# Patient Record
Sex: FEMALE | Race: WHITE | NOT HISPANIC OR LATINO
[De-identification: names, ages, dates, MRNs, and addresses within clinical notes are randomized per-mention and may not be internally consistent; named-entity substitution may affect disease eponyms.]

---

## 2018-09-25 ENCOUNTER — RECORDS - HEALTHEAST (OUTPATIENT)
Dept: ADMINISTRATIVE | Facility: OTHER | Age: 48
End: 2018-09-25

## 2018-09-25 ENCOUNTER — OFFICE VISIT - HEALTHEAST (OUTPATIENT)
Dept: VASCULAR SURGERY | Facility: CLINIC | Age: 48
End: 2018-09-25

## 2018-09-25 ENCOUNTER — AMBULATORY - HEALTHEAST (OUTPATIENT)
Dept: OTHER | Facility: CLINIC | Age: 48
End: 2018-09-25

## 2018-09-25 DIAGNOSIS — I83.893 SYMPTOMATIC VARICOSE VEINS OF BOTH LOWER EXTREMITIES: ICD-10-CM

## 2018-09-25 DIAGNOSIS — I87.2 VENOUS INSUFFICIENCY OF BOTH LOWER EXTREMITIES: ICD-10-CM

## 2018-09-26 ENCOUNTER — COMMUNICATION - HEALTHEAST (OUTPATIENT)
Dept: OTHER | Facility: CLINIC | Age: 48
End: 2018-09-26

## 2018-10-02 ENCOUNTER — AMBULATORY - HEALTHEAST (OUTPATIENT)
Dept: OTHER | Facility: CLINIC | Age: 48
End: 2018-10-02

## 2018-10-23 ENCOUNTER — RECORDS - HEALTHEAST (OUTPATIENT)
Dept: VASCULAR ULTRASOUND | Facility: CLINIC | Age: 48
End: 2018-10-23

## 2018-10-23 DIAGNOSIS — I83.893 VARICOSE VEINS OF BILATERAL LOWER EXTREMITIES WITH OTHER COMPLICATIONS: ICD-10-CM

## 2018-10-23 DIAGNOSIS — I87.2 VENOUS INSUFFICIENCY (CHRONIC) (PERIPHERAL): ICD-10-CM

## 2019-01-30 ENCOUNTER — COMMUNICATION - HEALTHEAST (OUTPATIENT)
Dept: TELEHEALTH | Facility: CLINIC | Age: 49
End: 2019-01-30

## 2019-01-30 ENCOUNTER — OFFICE VISIT - HEALTHEAST (OUTPATIENT)
Dept: VASCULAR SURGERY | Facility: CLINIC | Age: 49
End: 2019-01-30

## 2019-01-30 DIAGNOSIS — I87.2 VENOUS INSUFFICIENCY OF BOTH LOWER EXTREMITIES: ICD-10-CM

## 2019-01-30 DIAGNOSIS — K42.9 UMBILICAL HERNIA WITHOUT OBSTRUCTION AND WITHOUT GANGRENE: ICD-10-CM

## 2019-01-30 DIAGNOSIS — I83.893 SYMPTOMATIC VARICOSE VEINS OF BOTH LOWER EXTREMITIES: ICD-10-CM

## 2019-01-30 ASSESSMENT — MIFFLIN-ST. JEOR: SCORE: 1203.29

## 2019-05-08 ENCOUNTER — COMMUNICATION - HEALTHEAST (OUTPATIENT)
Dept: TELEHEALTH | Facility: CLINIC | Age: 49
End: 2019-05-08

## 2019-05-08 ENCOUNTER — RECORDS - HEALTHEAST (OUTPATIENT)
Dept: VASCULAR ULTRASOUND | Facility: CLINIC | Age: 49
End: 2019-05-08

## 2019-05-08 ENCOUNTER — RECORDS - HEALTHEAST (OUTPATIENT)
Dept: ADMINISTRATIVE | Facility: OTHER | Age: 49
End: 2019-05-08

## 2019-05-08 ENCOUNTER — OFFICE VISIT - HEALTHEAST (OUTPATIENT)
Dept: VASCULAR SURGERY | Facility: CLINIC | Age: 49
End: 2019-05-08

## 2019-05-08 DIAGNOSIS — I87.2 VENOUS INSUFFICIENCY (CHRONIC) (PERIPHERAL): ICD-10-CM

## 2019-05-08 DIAGNOSIS — I83.893 VARICOSE VEINS OF BILATERAL LOWER EXTREMITIES WITH OTHER COMPLICATIONS: ICD-10-CM

## 2019-05-08 DIAGNOSIS — I83.893 SYMPTOMATIC VARICOSE VEINS OF BOTH LOWER EXTREMITIES: ICD-10-CM

## 2019-05-08 DIAGNOSIS — I87.2 VENOUS INSUFFICIENCY OF BOTH LOWER EXTREMITIES: ICD-10-CM

## 2019-05-08 ASSESSMENT — MIFFLIN-ST. JEOR: SCORE: 1205.56

## 2019-05-20 ENCOUNTER — COMMUNICATION - HEALTHEAST (OUTPATIENT)
Dept: VASCULAR SURGERY | Facility: CLINIC | Age: 49
End: 2019-05-20

## 2019-08-20 ENCOUNTER — COMMUNICATION - HEALTHEAST (OUTPATIENT)
Dept: VASCULAR SURGERY | Facility: CLINIC | Age: 49
End: 2019-08-20

## 2019-08-30 ENCOUNTER — RECORDS - HEALTHEAST (OUTPATIENT)
Dept: VASCULAR ULTRASOUND | Facility: CLINIC | Age: 49
End: 2019-08-30

## 2019-08-30 DIAGNOSIS — I87.2 VENOUS INSUFFICIENCY (CHRONIC) (PERIPHERAL): ICD-10-CM

## 2019-08-30 DIAGNOSIS — I83.893 VARICOSE VEINS OF BILATERAL LOWER EXTREMITIES WITH OTHER COMPLICATIONS: ICD-10-CM

## 2019-09-11 ENCOUNTER — COMMUNICATION - HEALTHEAST (OUTPATIENT)
Dept: TELEHEALTH | Facility: CLINIC | Age: 49
End: 2019-09-11

## 2019-09-11 ENCOUNTER — OFFICE VISIT - HEALTHEAST (OUTPATIENT)
Dept: VASCULAR SURGERY | Facility: CLINIC | Age: 49
End: 2019-09-11

## 2019-09-11 DIAGNOSIS — K42.9 UMBILICAL HERNIA WITHOUT OBSTRUCTION AND WITHOUT GANGRENE: ICD-10-CM

## 2019-09-11 DIAGNOSIS — I87.2 VENOUS INSUFFICIENCY OF BOTH LOWER EXTREMITIES: ICD-10-CM

## 2019-09-11 DIAGNOSIS — I83.893 SYMPTOMATIC VARICOSE VEINS OF BOTH LOWER EXTREMITIES: ICD-10-CM

## 2019-09-16 ENCOUNTER — AMBULATORY - HEALTHEAST (OUTPATIENT)
Dept: OTHER | Facility: CLINIC | Age: 49
End: 2019-09-16

## 2019-09-24 ENCOUNTER — AMBULATORY - HEALTHEAST (OUTPATIENT)
Dept: OTHER | Facility: CLINIC | Age: 49
End: 2019-09-24

## 2020-01-29 ENCOUNTER — OFFICE VISIT - HEALTHEAST (OUTPATIENT)
Dept: VASCULAR SURGERY | Facility: CLINIC | Age: 50
End: 2020-01-29

## 2020-01-29 ENCOUNTER — COMMUNICATION - HEALTHEAST (OUTPATIENT)
Dept: TELEHEALTH | Facility: CLINIC | Age: 50
End: 2020-01-29

## 2020-01-29 DIAGNOSIS — I87.2 VENOUS INSUFFICIENCY OF BOTH LOWER EXTREMITIES: ICD-10-CM

## 2020-01-29 DIAGNOSIS — I83.893 SYMPTOMATIC VARICOSE VEINS OF BOTH LOWER EXTREMITIES: ICD-10-CM

## 2020-01-29 DIAGNOSIS — K42.9 UMBILICAL HERNIA WITHOUT OBSTRUCTION AND WITHOUT GANGRENE: ICD-10-CM

## 2020-02-12 ENCOUNTER — AMBULATORY - HEALTHEAST (OUTPATIENT)
Dept: VASCULAR SURGERY | Facility: CLINIC | Age: 50
End: 2020-02-12

## 2020-08-11 ENCOUNTER — COMMUNICATION - HEALTHEAST (OUTPATIENT)
Dept: VASCULAR SURGERY | Facility: CLINIC | Age: 50
End: 2020-08-11

## 2021-05-26 VITALS
HEART RATE: 72 BPM | SYSTOLIC BLOOD PRESSURE: 110 MMHG | DIASTOLIC BLOOD PRESSURE: 60 MMHG | RESPIRATION RATE: 12 BRPM | TEMPERATURE: 98.8 F

## 2021-05-26 VITALS
RESPIRATION RATE: 12 BRPM | HEART RATE: 72 BPM | SYSTOLIC BLOOD PRESSURE: 120 MMHG | TEMPERATURE: 98.9 F | DIASTOLIC BLOOD PRESSURE: 70 MMHG

## 2021-05-28 NOTE — PROGRESS NOTES
US today bilateral GSv RFA option reviewed and will preauth > 3months compression, symp bilateral VV,[facility],hernia sx by Dr Hairston

## 2021-05-28 NOTE — PATIENT INSTRUCTIONS - HE
Varicose Vein Pre-Procedure Instructions    You are scheduled for a varicose vein treatment on your legs. The following is some helpful information for you in regards to your treatment.    **Important:  A  will be needed post procedure. We will supply a thigh high compression stocking for you unless if you have one.  Please be aware, it is not advised to fly within 3 weeks post procedure    Please wear comfortable clothing.  We recommend that you bring a change of under clothes; they may get stained by the cleansing solution.    Feel free to bring a personal music player or a CD to listen to during your procedure.    Take your routine medications as you normally would.    It is ok to eat prior to this procedure.    Please allow 1- 2 hours for your appointment.    For any questions regarding your procedure please call   [phone] to speak with the nurse.    If you would like a Good [place]elo Estimate for your upcoming service/procedure contact Cost of Care Estimates at [phone], advocates are available Monday through Friday 8am - 5pm.  Code [id number]    Please have the following information available:  1. Patient name and date of birth  2. Insurance company, plan name, ID and group numbers  3. Description of the service/procedure and the associated procedure code numbers, if available. If more than one (1) procedure code, indicate which will be the primary procedure code.   4. The facility where the service/procedure will be performed.  5. The name of the physician involved with the service/procedure.  6. Appointment date of service.  7. Telephone number to call with the information.

## 2021-05-28 NOTE — PROGRESS NOTES
Patient wearing compression socks and will wrap legs in the Summer.  Patient complains of pain rated @ 3 today.

## 2021-05-28 NOTE — TELEPHONE ENCOUNTER
Ashli from [facility] called back and left a message but did not leave any information what she is looking for regarding the prior authorization. Placed call to her again and requested she call back with what information she is looking for regarding RFA prior authorization.

## 2021-05-28 NOTE — TELEPHONE ENCOUNTER
Ashli called back from [facility] and requested an updated office note from 5/8/19 and ultrasound report from that day.    Both of these documents were faxed with confirmation.

## 2021-05-31 NOTE — TELEPHONE ENCOUNTER
Left message for patient regarding vein ablation procedure next week. Advised to bring a . Explained it is ok to eat and drink prior to procedure with exception of blood thinner. We will supply patient with a thigh high compression sock. We carry from size medium to extra large.If patient doesn't fit into them they will need to provide their own. Questions answered. Call if any further questions.

## 2021-06-01 NOTE — PROGRESS NOTES
S: Patient was seen in [place] to be measured for knee-high compression stockings 20-30 mmHg like she has received from me in the past. RX on-file is current from Dr. Lester.    O: Goal is to evaluate and measure patient for a compression garment that will help control her Varicose Veins and Venous Insufficiency. Tabatha's legs are wrapped currently after her laser varicose vein removal surgery with Dr. Umana.    A: I took measurements again for her to get another pair of charcoal Medi Comfort Vitality compression stockings 20-30 mmhg, as well as a pair of Medi Comfort knee-highs 20-30 mmHg in wheat (both in 2, std which is the same size as last time).    P: I will call Tabatha when her garments are in before shipping to her home address which was reverified.

## 2021-06-01 NOTE — PATIENT INSTRUCTIONS - HE
Resume normal activity with exception of no flight for one more week.  Use compression socks as much as possible when on feet, long car rides and on air planes.  Return to clinic in 4 months.  Call if any questions [phone]   Advised to have H and P

## 2021-06-01 NOTE — PROGRESS NOTES
S: I have received Tabatha's Medi Comfort and Medi Comfort Vitality knee high compression stockings. Rx on file is current.    A: No assessment was made. I will be shipping the garments to her home address, along with proper compliance paperwork to be signed, dated, and returned.    P: She is to call with any further needs.

## 2021-06-01 NOTE — PROGRESS NOTES
Post Procedure Note Endovenous Closure    S: Tabatha Hogue is a 49 y.o. female S/P Bilateral  leg endovenous closure ofBilateral lower ext. Two weeks out from procedure. Doing well, wore female  thigh high socks. Minimal discomfort. Some superficial phlibitis. Which is resolving.  She is also here and wants to discuss about fixing her umbilical hernia this will be done through same-day surgery.    O:   Vitals:    09/11/19 1207   BP: 120/70   Pulse: 72   Resp: 12   Temp: 98.9  F (37.2  C)   TempSrc: Oral       General: no apparent distress  Abdomen: She has umbilical hernia about 1 1/2 cm in diameter reduce it was pretty tender on reduction  Legs look good no signs of infection, incisions healing nicely.    A/P: S/p endovenous closure. For insuffiencey of veins   Umbilical hernia    We will schedule umbilical hernia seen by general surgery office.  I gave her my 's number.  Orders have been written for umbilical hernia repair under local MAC anesthesia the same day surgery setting.  She understands risk of infection bleeding recurrence rates.  Also she will need to have a preop history and physical.    From the standpoint of VNUS Closure she is doing well has some bruising discussed and answer questions related to that    May switch to knee high support socks   Resume all activities   RTC 4 months   Call for any questions or concerns     Simba Lester MD   [facility]

## 2021-06-01 NOTE — PROGRESS NOTES
2 week f/u RFA. Doing wellResume normal activity with exception of no flight for one more week.  Use compression socks as much as possible when on feet, long car rides and on air planes.  Return to clinic in 4 months.  Umbilical hernia to be repaired.

## 2021-06-02 VITALS — WEIGHT: 129.5 LBS | BODY MASS INDEX: 21.58 KG/M2 | HEIGHT: 65 IN

## 2021-06-03 VITALS — BODY MASS INDEX: 21.66 KG/M2 | WEIGHT: 130 LBS | HEIGHT: 65 IN

## 2021-06-05 NOTE — PROGRESS NOTES
4 month f/u psot RFA bilateral GSV, Umbilical hernia. Left post knee and right shin/calf stab phlebectomy code 37766x2 offered.Will preauth through [facility]ope to see for sclerotherapy

## 2021-06-06 NOTE — PROGRESS NOTES
[facility] stab phlebectomy CPT 37766x2 [facility] [phone].

## 2021-06-17 NOTE — PATIENT INSTRUCTIONS - HE
"Patient Instructions by Jesus Fragoso RN at 1/30/2019 10:40 AM     Author: Jesus Fragoso RN Service: -- Author Type: Registered Nurse    Filed: 1/30/2019 11:32 AM Encounter Date: 1/30/2019 Status: Addendum    : Jesus Fragoso RN (Registered Nurse)    Related Notes: Original Note by Jesus Fragoso RN (Registered Nurse) filed at 1/30/2019 11:26 AM       We are prescribing some compression stockings for you. I have included different suppliers that should help you get measured and fitting to ensure proper fitting socks. You should wear this socks as much as you can. It is especially important to wear them with long periods of sitting/standing, long car rides or if you will be flying. Compression socks should get refilled every 4-6 months. They do not need to be worn at night while in bed.    If you do a lot of standing it is good to do calf raises to help keep the blood pumping. If you sit a lot at work it is good to get up periodically to walk around. Elevation of the foot of your bed 4-6\" helps the blood return back to where it is needed.        Varicose Veins      Varicose veins are swollen, enlarged veins most often found in the legs. They are usually blue or purple in color and may bulge, twist, and stand out under the skin.  Normally, veins return blood from the body to the heart. The leg veins have one-way valves that prevent blood from flowing backward in the vein. When the valves are weak or damaged, blood backs up in the veins. This may cause some of the veins to swell and bulge and become varicose veins.  Symptoms  Varicose veins may or may not cause symptoms. If symptoms do occur, they can include:    Legs that feel tired, achy, heavy, or itchy    Leg muscle cramps    Skin changes, such as discoloration, dryness, redness, or rash (in more severe cases, you may also have sores on the skin called venous leg ulcers)  Risk Factors  There are a number of factors that increase the risk for varicose " veins. These can include:    Being a woman    Being older    Sitting or standing for long periods    Being overweight    Being pregnant    Having a family history of varicose veins  Treatment begins with simple self-help measures (see below). If these dont help, there are many procedures that can be done to shrink or remove varicose veins. Your healthcare provider can tell you more about these options, if needed.  Home care    Support or compression stockings will likely be prescribed. If so, be sure to wear them as directed. They may help improve blood flow.    Exercising helps strengthen your leg muscles and improve blood flow. To get the most benefit, choose exercises such as walking, swimming, or cycling. Also try to exercise for at least 30 minutes on most days.    Raising (elevating) your legs lets gravity help blood flow back to the heart. Sit or lie with your feet above heart level a few times throughout the day, or as directed.    Avoid long periods of sitting or standing. Change positions often. Also, move your ankles, toes and knees often. This may also help improve blood flow.    If you are overweight, talk with your healthcare provider about setting up a weight-loss plan. Maintaining a healthy weight can help reduce the strain on your veins. It may also improve symptoms, such as swelling and aching.    If you have dryness and itching, ask your provider about special lotions that can be applied to the skin to help improve symptoms.  Follow-up care  Follow up with your healthcare provider, or as directed. If imaging tests were done, youll be told the results and if there are any new findings that affect your care.  When to seek medical advice  Call your healthcare provider right away if any of these occur:    Sudden, severe leg swelling, pain, or redness    Symptoms worsen, or they dont improve with self-care    Bleeding from any affected veins    Ulcers form on the legs, ankles, or feet    Fever of  100.4 F (38 C) or higher, or as advised by your provider      Understanding Spider and Varicose Veins  Do you often hide your legs because of the way they look? You may have noticed tiny red or blue bursts (spider veins). Or maybe you have veins that bulge or look twisted (varicose veins). If so, there are treatments that can help  What are the symptoms?  Spider veins or varicose veins may never be a problem. But sometimes they can cause legs to ache or swell. Your legs may also feel heavy and tired, or like theyre burning. These symptoms may be more severe at the end of the day. Prolonged sitting or standing can also make your symptoms worse.  Who gets spider and varicose veins?  Anyone can get spider or varicose veins. But vein problems tend to be hereditary (run in families). Other factors that can affect veins include:    Pregnancy, hormones, and birth control pills    A job where you stand or sit a lot    Extra weight or lack of exercise    Age         Spider veins look like tiny webs on the ankles, legs, and upper thighs.       Ropy, dark blue, red, or flesh-colored varicose veins are most common on the thighs, calves, and feet.    What can be done?  Spider and varicose veins can affect the way you feel about yourself. Talk to your healthcare provider about your concerns. There are treatments that can ease symptoms and make your legs look better.  Your treatment choices  Treatment may include self-care, sclerotherapy (injecting veins with a chemical), surgery, or newer nonsurgical minimally invasive therapies. Spider veins and some varicose veins can be treated with sclerotherapy. Large varicose veins can often be treated with newer minimally invasive procedures and, in rare cases, surgery may be needed.     Please call [facility] to schedule an appointment. If you received a prescription please bring it with you to your appointment. You may call one of the locations below, although  some locations are limited to what they carry.    Office Locations    [place]  [address]/Orthotics and Prosthetics [address]  [place] [address]   Phone: [phone]  Fax [phone]    [facility]  [address]  [place] [address]  Phone: [phone]  Fax: [phone]    [facility]   [address]  [place] [address]  Phone: [phone]  Fax: [phone]    [facility]  [address]  [place] [address]  Phone: [phone]  Fax: [phone]    [facility]  [address]  [place] [address]  Phone: [phone]  Fax: [phone]    [facility]  [address]   [place] [address]   Phone: [phone]  Fax: [phone]    [place]   [address] [place] [address]   Phone: [phone]  Fax: [phone]    [facility]  [address]  [place] [address]    [place] [phone]  [phone]  [phone]  [place] [phone]  [email or web address]      [facility]   [address]             [address]                 [address]     [place] [address]      [place] [address]         [place] [address]  [phone]  [phone]     [phone]               Fax: [phone]  [email or web address]                                                     [facility]  [address] [place] [address]  [phone]  [phone]  [email or web address]    Jerome Marti  [phone]  [email or web address]    [facility]   [phone]    [place] [address]  [place] [address]    [phone]    Patient Education     Hernia (Adult)    A hernia can happen when there is a weakness or defect in the wall of the abdomen or groin. Intestines or nearby tissues may move from their usual location and push through the weakness in the wall. This can cause a hernia (bulge) you may see or feel.  Causes and risk factors   A hernia may be present at birth. Or it may be caused by the wear and tear of daily living. Certain factors can make a hernia more likely. These can include:    Heavy lifting    Straining, whether from lifting, movement, or constipation    Chronic cough    Injury to the abdominal wall    Excess weight    Pregnancy    Prior surgery    Older age    Family history of hernia  Symptoms  Symptoms of a hernia may come on suddenly. Or they may appear slowly over time. Some common symptoms include:    Bulge in the groin area, around the navel, or in the scrotum (the bulge may get bigger when you stand and go away when you lie down)    Pain or pressure around the bulge    Pain during activities such as lifting, coughing, or sneezing    A feeling of weakness or pressure in the groin    Pain or swelling in the scrotum  Types of hernias  There are different types of hernia. The type you have depends on its location:    Inguinal. This type is in the groin or scrotum. It is more common in men.    Femoral. This type is in the groin, upper thigh (where the leg bends), or labia. It is more common in women.    Ventral. This type is in the abdominal wall.    Umbilical. This type occurs around the navel (belly button).    Incisional. This type occurs at the site of a previous surgery.  The condition of the hernia can help determine how urgently it needs to be treated.    Reducible. It goes back in by itself, or it can be pushed back in.    Irreducible. It cant be pushed back in.    Incarcerated/strangulated. The intestine is trapped (incarcerated). If this happens, you wont  be able to push the bulge back in. If the incarcerated hernia isnt treated, it may become strangulated. This means the area loses blood supply and the tissue may die. This requires emergency surgery. You need treatment right away.  In most cases, a hernia will not heal on its own. Surgery is usually needed to repair the defect in the abdominal wall or groin. Youll be told more about surgery, if needed.  If your symptoms are not severe, treatment may sometimes be delayed. In such cases, regular follow-up visits with the provider will be needed. Youll be asked to keep track of your symptoms and to watch for signs of more serious problems. You may also be given guidelines similar to the home care instructions below.  Home care  To help keep a hernia from getting worse, you may be advised to:    Avoid heavy lifting and straining as directed.    Take steps to prevent constipation, such as eating more fiber and drinking more water. This may help reduce straining that can occur when having a bowel movement. Reducing straining may help keep your symptoms from getting worse.    Maintain a healthy weight or lose excess weight. This can help reduce strain on abdominal muscles and tissues.    Stop smoking. This can help prevent coughing that may also strain abdominal muscles and tissues.  Follow-up care  Follow up with your healthcare provider, or as directed. If imaging tests were done, they will be reviewed a doctor. You will be told the results and any new findings that may affect your care.  When to seek medical advice  Call your healthcare provider right away if any of these occur:    Hernia hardens, swells, or grows larger    Hernia can no longer be pushed back in    Pain moves to the lower right abdomen (just below the waistline), or spreads to the back  Call 911  Call 911 if any of these occur:    Nausea and vomiting    Severe pain, redness, or tenderness in the area near the hernia    Pain worsens quickly and doesnt  get better    Inability to have a bowel movement or pass gas    Fever of 100.4 F (38 C) or higher, or as directed by your healthcare provider    Trouble breathing    Fainting    Rapid heart rate    Vomiting blood    Large amounts of blood in stool  Date Last Reviewed: 6/9/2015 2000-2017 [employer]. [address] [place] [address]. All rights reserved. This information is not intended as a substitute for professional medical care. Always follow your healthcare professional's instructions.      SCLEROTHERAPY  Dr. Laura Linn  [phone]

## 2021-06-18 NOTE — LETTER
Letter by Simba Lester MD at      Author: Simba Lester MD Service: -- Author Type: --    Filed:  Encounter Date: 2019 Status: (Other)       Katelyn Garduno MD  [address]  [facility] [address]                                  2019    Patient: Tabatha Hogue   MR Number: [id number]   YOB: 1970   Date of Visit: 2019     Dear Dr. Laureen MD:    Thank you for referring Tabatha Hogue to me for evaluation. Below are the relevant portions of my assessment and plan of care.    If you have questions, please do not hesitate to call me. I look forward to following Tabatha along with you.    Sincerely,        Simba Lester MD          CC  No Recipients  Simba Lester MD  2019  4:15 PM  Sign at close encounter  [facility] Follow up    HPI:    48 y.o. year old female who returns for a follow up.  She has symptomatic leg pain and issues.  She is here for follow-up she is worn compression now for this whole time.  With continued ongoing symptoms.  She also at this time point is some a new issue which is a symptomatic umbilical hernia.  This bulges all the time because of symptoms especially with lifting or doing anything physical.  Started inhibit her working out in her exercise routine.    Allergies:Erythromycin and Morphine    History reviewed. No pertinent past medical history.    Past Surgical History:   Procedure Laterality Date   ?  SECTION      2 live babies       CURRENT MEDS:  Current Outpatient Medications on File Prior to Visit   Medication Sig Dispense Refill   ? aspirin-acetaminophen-caffeine (EXCEDRIN MIGRAINE) 250-250-65 mg per tablet Take 1 tablet by mouth.       No current facility-administered medications on file prior to visit.        History reviewed. No pertinent family history.     reports that  has never smoked. she has never used smokeless tobacco. She reports that she drinks alcohol. She reports that she does not use  "drugs.    Review of Systems:  Negative except leg pain bilaterally umbilical hernia pain.Otherwise twelve system of review is negative.      OBJECTIVE:  Vitals:    01/30/19 1105   BP: 104/76   Pulse: 72   Resp: 16   Temp: 98.5  F (36.9  C)   TempSrc: Oral   Weight: 129 lb 8 oz (58.7 kg)   Height: 5' 5\" (1.651 m)     Body mass index is 21.55 kg/m .    EXAM:  GENERAL: This is a well-developed 48 y.o. female who appears her stated age  HEAD: normocephalic  HEENT: Pupils equal and reactive bilaterally  CARDIAC: RRR without murmur  CHEST/LUNG:  Clear to auscultation  ABDOMEN: Soft, nontender, nondistended, umbilical hernia which is reducible.  No other masses are noted.  NEUROLOGIC: Focally intact, nonfocal  VASCULAR: Pulses intact, symmetrical upper and lower extremities.  Bilateral varicose veins and spider veins noted bilaterally.                                     LABS:  No results found for: WBC, HGB, HCT, MCV, PLT  INR/Prothrombin Time      No results found for: HGBA1C  No results found for: ALT, AST, GGT, ALKPHOS, BILITOT     Images:     US Venous Insufficiency Legs Bilateral (Order [id number])   Imaging   Date: 10/23/2018 Department: [facility] Ultrasound [place] Released By: Tanisha Goel Authorizing: Simba Lester MD   Study Result     [facility] OUT[facility]IENT  10/23/2018     EXAM: BILATERAL LOWER EXTREMITY DEEP AND SUPERFICIAL VENOUS DUPLEX ULTRASOUND WITH PHYSIOLOGIC TESTING     INDICATION: Bilateral lower extremity pain and swelling. Symptomatic varicose veins. Assess for incompetent veins.     TECHNIQUE: Supine and upright ultrasound of the deep and superficial veins with Valsalva and compression augmentation maneuvers. Duplex imaging is performed utilizing gray-scale, two-dimensional images, color-flow imaging, Doppler waveform analysis, and   spectral Doppler imaging.      INCOMPETENCY CRITERIA: Deep vein reflux reported when greater than 1,000 ms flow reversal.  " Superficial vein reflux reported when greater than 500 ms flow reversal.  vein reflux reported as greater than 350 ms flow reversal.     DEEP VEIN FINDINGS:     RIGHT LEG: The common femoral, profunda femoral, femoral, popliteal, and visualized calf veins are patent and compressible.  Incompetent right common femoral vein and popliteal vein.     LEFT LEG:  The common femoral, profunda femoral, femoral, popliteal, and visualized calf veins are patent and compressible.   No incompetency of the deep left leg veins.     RIGHT SUPERFICIAL VEIN FINDINGS:  GREAT SAPHENOUS VEIN: Segmental incompetency of the right great saphenous vein at the proximal thigh, knee and mid calf. The right great saphenous vein measures 10 mm at the saphenofemoral junction, 6 mm proximal thigh, 5 mm knee and 6 mm mid calf.     SMALL SAPHENOUS VEIN: The right small saphenous vein is too small to visualize at the proximal calf and mid calf. The right small saphenous vein is patent and competent at the saphenous popliteal junction.     No incompetent perforating veins or abnormal accessory veins identified.     LEFT SUPERFICIAL VEIN FINDINGS:  GREAT SAPHENOUS VEIN: Segmental incompetency of the left great saphenous vein at the proximal thigh and mid calf. The left great saphenous vein measures 7 mm at the saphenofemoral junction, 4 mm proximal thigh, 3 mm knee and 4 mm mid calf.     SMALL SAPHENOUS VEIN: Left small saphenous vein is patent and competent at the saphenous popliteal junction. The left small saphenous vein is too small to visualize at the proximal calf and mid calf.     No incompetent perforating veins or abnormal accessory veins identified.     IMPRESSION:   CONCLUSION:   1.  No deep venous thrombosis of either lower extremity. Incompetent right common femoral vein and popliteal vein.  2.  RIGHT LEG: Segmental incompetency of the right great saphenous vein at the proximal thigh, knee and mid calf.  3.  LEFT LEG: Segmental  incompetency of the left great saphenous vein at the proximal thigh and mid calf.         Assessment/Plan:   1. Symptomatic varicose veins of both lower extremities  At this time point I would continue her current compression socks exercise routine and regular basis.. Qualify for a closure procedure but I would probably repeat her ultrasound here in another 3 months to be 6 months after a year out from her prior study.  - Compression stockings  - US Venous Insufficiency Legs Bilateral; Future    2. Venous insufficiency of both lower extremities  Same  - Compression stockings  - US Venous Insufficiency Legs Bilateral; Future    3. Umbilical hernia without obstruction and without gangrene  At this time point umbilical hernia can be easily repaired under same day surgery in outpatient setting.  This was done under local MAC anesthesia.  This hernia is small enough I think most likely be done with a primary repair.  We discussed the risks of infection bleeding clotting DVTs recurrence rates and at this time point she would like to proceed.  We will try to arrange through my general surgery scheduling clinic.  All questions today and orders were placed.    - Verify informed consent for procedure; Standing  - Verify informed consent for Anesthesia; Standing  - Skin prep - Clip hair as needed; Standing  - Apply 2% Chlorhexidine Gluconate cloth (Rick Cloth) to surgical area.; Standing  - Insert and maintain IV; Standing  - sodium chloride bacteriostatic 0.9 % injection 0.1-0.3 mL; Inject 0.1-0.3 mL under the skin as needed (for IV insertion).        - sodium chloride flush 3 mL (NS); Infuse 3 mL into a venous catheter Line Care.        - Case Request (Do not enter this order if case already scheduled): REPAIR, HERNIA, UMBILICAL, OPEN; Standing  - Place sequential compression device; Standing  - ceFAZolin 2 g in 100 mL in D5W (ANCEF); Infuse 100 mL (2 g total) into a venous catheter 30 minutes before surgery or procedure  (prior to procedure).        - Case Request (Do not enter this order if case already scheduled): REPAIR, HERNIA, UMBILICAL, OPEN         Simba Lester MD  [facility] Department of Surgery

## 2021-06-18 NOTE — PATIENT INSTRUCTIONS - HE
"Patient Instructions by Jesus Fragoso RN at 1/29/2020 11:40 AM     Author: Jesus Fragoso RN Service: -- Author Type: Registered Nurse    Filed: 1/29/2020 12:31 PM Encounter Date: 1/29/2020 Status: Addendum    : Jesus Fragoso RN (Registered Nurse)    Related Notes: Original Note by Jesus Fragoso RN (Registered Nurse) filed at 1/29/2020 12:13 PM       We are prescribing some compression stockings for you. I have included different suppliers that should help you get measured and fitting to ensure proper fitting socks. You should wear this socks as much as you can. It is especially important to wear them with long periods of sitting/standing, long car rides or if you will be flying. Compression socks should get refilled every 4-6 months. They do not need to be worn at night while in bed.    If you do a lot of standing it is good to do calf raises to help keep the blood pumping. If you sit a lot at work it is good to get up periodically to walk around. Elevation of the foot of your bed 4-6\" helps the blood return back to where it is needed.        Varicose Veins      Varicose veins are swollen, enlarged veins most often found in the legs. They are usually blue or purple in color and may bulge, twist, and stand out under the skin.  Normally, veins return blood from the body to the heart. The leg veins have one-way valves that prevent blood from flowing backward in the vein. When the valves are weak or damaged, blood backs up in the veins. This may cause some of the veins to swell and bulge and become varicose veins.  Symptoms  Varicose veins may or may not cause symptoms. If symptoms do occur, they can include:    Legs that feel tired, achy, heavy, or itchy    Leg muscle cramps    Skin changes, such as discoloration, dryness, redness, or rash (in more severe cases, you may also have sores on the skin called venous leg ulcers)  Risk Factors  There are a number of factors that increase the risk for varicose " veins. These can include:    Being a woman    Being older    Sitting or standing for long periods    Being overweight    Being pregnant    Having a family history of varicose veins  Treatment begins with simple self-help measures (see below). If these dont help, there are many procedures that can be done to shrink or remove varicose veins. Your healthcare provider can tell you more about these options, if needed.  Home care    Support or compression stockings will likely be prescribed. If so, be sure to wear them as directed. They may help improve blood flow.    Exercising helps strengthen your leg muscles and improve blood flow. To get the most benefit, choose exercises such as walking, swimming, or cycling. Also try to exercise for at least 30 minutes on most days.    Raising (elevating) your legs lets gravity help blood flow back to the heart. Sit or lie with your feet above heart level a few times throughout the day, or as directed.    Avoid long periods of sitting or standing. Change positions often. Also, move your ankles, toes and knees often. This may also help improve blood flow.    If you are overweight, talk with your healthcare provider about setting up a weight-loss plan. Maintaining a healthy weight can help reduce the strain on your veins. It may also improve symptoms, such as swelling and aching.    If you have dryness and itching, ask your provider about special lotions that can be applied to the skin to help improve symptoms.  Follow-up care  Follow up with your healthcare provider, or as directed. If imaging tests were done, youll be told the results and if there are any new findings that affect your care.  When to seek medical advice  Call your healthcare provider right away if any of these occur:    Sudden, severe leg swelling, pain, or redness    Symptoms worsen, or they dont improve with self-care    Bleeding from any affected veins    Ulcers form on the legs, ankles, or feet    Fever of  100.4 F (38 C) or higher, or as advised by your provider      Understanding Spider and Varicose Veins  Do you often hide your legs because of the way they look? You may have noticed tiny red or blue bursts (spider veins). Or maybe you have veins that bulge or look twisted (varicose veins). If so, there are treatments that can help  What are the symptoms?  Spider veins or varicose veins may never be a problem. But sometimes they can cause legs to ache or swell. Your legs may also feel heavy and tired, or like theyre burning. These symptoms may be more severe at the end of the day. Prolonged sitting or standing can also make your symptoms worse.  Who gets spider and varicose veins?  Anyone can get spider or varicose veins. But vein problems tend to be hereditary (run in families). Other factors that can affect veins include:    Pregnancy, hormones, and birth control pills    A job where you stand or sit a lot    Extra weight or lack of exercise    Age         Spider veins look like tiny webs on the ankles, legs, and upper thighs.       Ropy, dark blue, red, or flesh-colored varicose veins are most common on the thighs, calves, and feet.    What can be done?  Spider and varicose veins can affect the way you feel about yourself. Talk to your healthcare provider about your concerns. There are treatments that can ease symptoms and make your legs look better.  Your treatment choices  Treatment may include self-care, sclerotherapy (injecting veins with a chemical), surgery, or newer nonsurgical minimally invasive therapies. Spider veins and some varicose veins can be treated with sclerotherapy. Large varicose veins can often be treated with newer minimally invasive procedures and, in rare cases, surgery may be needed.     Please call [facility] to schedule an appointment. If you received a prescription please bring it with you to your appointment. You may call one of the locations below, although  some locations are limited to what they carry.    Office Locations  New Locations  [facility]  Home Medical Equipment  [address], [place] [address]  Orthotics and Prosthetics ([facility])  [address], [place] [address]  Phone [phone] /Fax [phone]        [place]   [address]   Medical [address]/Orthotics and Prosthetics [address]  [place] [address]   Phone: [phone]  Fax [phone]    [facility]  [address]  [place] [address]  Phone: [phone]  Fax: [phone]    [facility]   [address]  [place] [address]  Phone: [phone]  Fax: [phone]    [facility]  [address]  [place] [address]  Phone: [phone]  Fax: [phone]    [facility]  [address]  [place] [address]  Phone: [phone]  Fax: [phone]    [facility] [place]  [address]   [place] [address]   Phone: [phone]  Fax: [phone]    [place]   [address] [place] [address]   Phone: [phone]  Fax: [phone]    [facility]  [address]  [place] [address]    [place] [phone]  [phone]  [place] [phone]  [email or web address]      [facility]   [address]             [address]                 [address]     [place] [address]      [place] [address]         [place], [place] [id number] [phone]  [phone]     [phone]               Fax: [phone]  [email or web address]                                                      [facility]  [address] [place] [address]  [phone]  [phone]  [email or web address]    Jerome Matri  [phone]  [email or web address] [facility]   [phone]    [facility]  [address] [place] [address]    [phone]    SCLEROTHERAPY  Dr. Laura Linn  [phone]      After your phlebectomy we would like to see you two weeks after follow up. If you don't already have a follow up appointment we should be seen at the Vascular Center in 2 weeks. Please ensure you are wrapping your leg with an ace bandage or short stretch wrap for the next 5-7 days until it is comfortable enough to transition into your compression stocking. Please call the Vascular Center with any questions at [phone]    Surgery for Varicose Veins  If you have large varicose veins, surgery may be the best choice. However, it will not prevent new varicose veins from forming. Surgery is most often performed in a hospital or surgery center on an outpatient basis.  Varicose vein surgery    Your surgery will be tailored to your needs. Varicose veins may be tied off (ligation), destroyed, or removed. Blood will then flow through the healthy veins. One or more of the following techniques may be used:  Vein stripping and ligation  In more severe cases, the surgeon may tie off and remove veins by making smaller cuts in the skin. Smaller branching veins may also be tied off or removed.  Microphlebectomy or ambulatory phlebectomy  A special hook is used to gently take out a varicose vein through tiny incisions. Microphlebectomy may be done in your healthcare providers office.  Sclerotherapy  Your healthcare provider will inject the varicose vein with a special chemical that will quickly close the vein from the inside. This is particularly useful for smaller veins.  PIN stripping  All or part of the vein may be removed with a stripping  instrument.  Ablation (laser or radiofrequency)  A tiny cut in the skin is made near the varicose vein. A small tube called a catheter is inserted into the vein. Energy or heat released from the catheter tip will make the vein walls collapse and stick together, stopping all blood flow through the vein.   Know about the risks  Your healthcare provider will talk with you about the risks of surgery. These include:    Bleeding or swelling    A sense of numbness, burning, or tingling in areas near the procedure    Edema or swelling in the legs    Clots in the deep veins that may travel to the lungs    Infection    Scarring   Date Last Reviewed: 5/1/2016 2000-2016 [employer]. [address] [place] [address]. All rights reserved. This information is not intended as a substitute for professional medical care. Always follow your healthcare professional's instructions.      Procedure: 37766x2 bilateral stab phlebectomy        The surgery scheduler Italia Benson at [phone] will contact you to schedule if you were not scheduled today.     You will need a preop physical within 30 days before surgery with your primary care provider. Please note if you do not get a complete History and Physical your surgery will be cancelled.   Please contact your primary to schedule.     A nurse should contact you from the hospital 1-2 days before surgery to review with you.    If you would like a Good [place]elo Estimate for your upcoming service/procedure contact Cost of Care Estimates at [phone], advocates are available Monday through Friday 8am - 5pm. You may also submit a request online at [email or web address]    [facility]  [address]  [place]  [address]     [phone] for facility charge    Anesthesiology charge  [phone]          Please don't hesitate to call us with any additional question or problems  Our number is  [phone]

## 2021-06-19 NOTE — LETTER
Letter by Simba Lester MD at      Author: Simba Lester MD Service: -- Author Type: --    Filed:  Encounter Date: 9/11/2019 Status: (Other)         Katelyn Garduno MD  [address]  [facility] [address]                                  September 11, 2019    Patient: Tabatha Hogue   MR Number: [id number]   YOB: 1970   Date of Visit: 9/11/2019     Dear Dr. Laureen MD:    Thank you for referring Tabatha Hogue to me for evaluation. Below are the relevant portions of my assessment and plan of care.    If you have questions, please do not hesitate to call me. I look forward to following Tabatha along with you.    Sincerely,        Simba Lester MD          CC  No Recipients  Simba Lester MD  9/11/2019  4:49 PM  Sign at close encounter  Post Procedure Note Endovenous Closure    S: Tabatha Hogue is a 49 y.o. female S/P Bilateral  leg endovenous closure ofBilateral lower ext. Two weeks out from procedure. Doing well, wore female  thigh high socks. Minimal discomfort. Some superficial phlibitis. Which is resolving.  She is also here and wants to discuss about fixing her umbilical hernia this will be done through same-day surgery.    O:   Vitals:    09/11/19 1207   BP: 120/70   Pulse: 72   Resp: 12   Temp: 98.9  F (37.2  C)   TempSrc: Oral       General: no apparent distress  Abdomen: She has umbilical hernia about 1 1/2 cm in diameter reduce it was pretty tender on reduction  Legs look good no signs of infection, incisions healing nicely.    A/P: S/p endovenous closure. For insuffiencey of veins   Umbilical hernia    We will schedule umbilical hernia seen by general surgery office.  I gave her my 's number.  Orders have been written for umbilical hernia repair under local MAC anesthesia the same day surgery setting.  She understands risk of infection bleeding recurrence rates.  Also she will need to have a preop history and physical.    From the standpoint of VNUS  Closure she is doing well has some bruising discussed and answer questions related to that    May switch to knee high support socks   Resume all activities   RTC 4 months   Call for any questions or concerns     Simba Lester MD   [facility]

## 2021-06-19 NOTE — LETTER
Letter by Simba Lester MD at      Author: Simba Lester MD Service: -- Author Type: --    Filed:  Encounter Date: 2019 Status: (Other)         Katelyn Garduno MD  [address]  [facility] [address]                                  May 19, 2019    Patient: Tabatha Hogue   MR Number: [id number]   YOB: 1970   Date of Visit: 2019     Dear Dr. Laureen MD:    Thank you for referring Tabatha Hogue to me for evaluation. Below are the relevant portions of my assessment and plan of care.    If you have questions, please do not hesitate to call me. I look forward to following Tabatha along with you.    Sincerely,        Simba Lester MD          CC  No Recipients  Simba Lester MD  2019 11:18 PM  Sign at close encounter  Follow Up: Varicose Veins/ Venous Insufficiency    Tabatha Hogue is a 48 y.o.  female here for followup. She has worn stockings now for 3 months. I saw them previously in 2019.  Continued progression of disease and symptoms and issues; reviewed ultrasound results. Patient has ongoing symptoms with pain and swelling needing intervention with pain meds secondary to interfering with daily activities and work. This now inhibits daily chores and activities.     Allergies:Erythromycin and Morphine    History reviewed. No pertinent past medical history.    Past Surgical History:   Procedure Laterality Date   ?  SECTION      2 live babies       CURRENT MEDS:  Current Outpatient Medications on File Prior to Visit   Medication Sig Dispense Refill   ? aspirin-acetaminophen-caffeine (EXCEDRIN MIGRAINE) 250-250-65 mg per tablet Take 1 tablet by mouth.       No current facility-administered medications on file prior to visit.        History reviewed. No pertinent family history.     reports that she has never smoked. She has never used smokeless tobacco. She reports that she drinks alcohol. She reports that she does not use drugs.    Review of  "Systems:  Negative except continued progression leg swelling, pain and varicose veins while wearing compression. It affects chores and daily living especially ones where she has to stand for longer lengths of time. Otherwise twelve system of review is negative.      OBJECTIVE:  Vitals:    05/08/19 1202   BP: 110/72   Pulse: 72   Temp: 98.8  F (37.1  C)   Weight: 130 lb (59 kg)   Height: 5' 5\" (1.651 m)     Body mass index is 21.63 kg/m .    EXAM:  GENERAL: This is a well-developed 48 y.o. female who appears her stated age  HEAD: normocephalic  HEENT: Pupils equal and reactive bilaterally  CARDIAC: RRR without murmur  CHEST/LUNG:  Clear to auscultation  ABDOMEN: Soft, nontender, nondistended, no masses    NEUROLOGIC: Focally intact, nonfocal  VASCULAR: Pulses intact, symmetrical upper and lower extremities. Varicose veins, Spider veins and Chronic venous stasis changes, bilateral                             Imaging:    US Venous Insufficiency Legs Bilateral (Order [id number])   Imaging   Date: 5/8/2019 Department: [facility] Ultrasound [place] Released By: Tanisha Goel Authorizing: Simba Lester MD   Study Result     LOCATION: [facility]  DATE: 5/8/2019     EXAM: BILATERAL LOWER EXTREMITY DEEP AND SUPERFICIAL VENOUS DUPLEX ULTRASOUND WITH PHYSIOLOGIC TESTING     INDICATION: Symptomatic varicose veins. Assess for incompetent veins.     TECHNIQUE: Supine and upright ultrasound of the deep and superficial veins with Valsalva and compression augmentation maneuvers. Duplex imaging is performed utilizing gray-scale, two-dimensional images, color-flow imaging, Doppler waveform analysis, and   spectral Doppler imaging.      INCOMPETENCY CRITERIA: Deep vein reflux reported when greater than 1,000 ms flow reversal.  Superficial vein reflux reported when greater than 500 ms flow reversal.  vein reflux reported as greater than 350 ms flow reversal.     DEEP VEIN " FINDINGS:     RIGHT LEG: The common femoral, profunda femoral, femoral, popliteal, and visualized calf veins are patent and compressible.     LEFT LEG:  The common femoral, profunda femoral, femoral, popliteal, and visualized calf veins are patent and compressible.      RIGHT SUPERFICIAL VEIN FINDINGS:  GREAT SAPHENOUS VEIN: Incompetent from the saphenofemoral junction to the mid calf. The vein measures 11 mm at the saphenofemoral junction, 7 mm at the proximal thigh, 6 mm at the knee and mid calf.     SMALL SAPHENOUS VEIN: Competent from the saphenopopliteal junction to the mid calf.     No incompetent perforating veins or abnormal accessory veins identified.     LEFT SUPERFICIAL VEIN FINDINGS:  GREAT SAPHENOUS VEIN: Incompetent from the saphenofemoral junction to the mid calf. The vein measures 6 mm at the saphenofemoral junction, 5 mm at the proximal thigh, 3 mm at the knee and mid calf.     SMALL SAPHENOUS VEIN: Competent from the saphenopopliteal junction to the mid calf.     No incompetent perforating veins or abnormal accessory veins identified.     IMPRESSION:   CONCLUSION:   1.  No deep venous thrombosis of either lower extremity.  2.  RIGHT LEG: The right superficial venous system is patent, with incompetence of the greater saphenous vein from the saphenofemoral junction to the mid calf.  3.  LEFT LEG: The left superficial venous system is patent, with incompetence of the greater saphenous vein from the saphenofemoral junction to the mid calf.         Assessment/Plan:    She has  incompetency and insuffiencey of the Bilateral  Greater  saphenous vein.  Right measures 11 mm  at the junction, left measures 6 mm. Deep systems are intact. No DVTs. Great candidate for endovenous  closure. We spent counseling time more than 50% of visit: 20 minutes today and discussed the procedure. The risks of anesthesia, infection, bleeding, clotting, DVTs, numbess at the insertion site dermatome and  the process and  procedure were discussed. Answered all questions today. Will submit this to Tabatha Hogue's insurance if needed for pre approval.Will set this up when approved. Discussed need to have a  and procedure woul take around 30 minutes with total time 2-3 hours. Also understands a small screening ultrasound 2-3 days out to rule out clot formation at the closed vessel.    DIAGNOSIS: Venous insufficiency of the  right greater saphenous vein and left greater saphenous vein      PROCEDURE: Endovenous closure of the right greater saphenous vein and left greater saphenous vein    Simba Lester MD  [facility] Surgery Dept.

## 2021-06-20 NOTE — PROGRESS NOTES
S: I have received Tabatha's Medi Comfort and Medi Comfort Vitality knee high compression stockings. Rx on file is current.    A: No assessment was made. I will be shipping the garments to her home address, along with proper compliance paperwork to be signed, dated, and returned.    P: She is to call with any further needs.  Goal is to maintain a home program.

## 2021-06-20 NOTE — PROGRESS NOTES
This is a new consult for Varicose Veins. The patient has varicose veins that are problematic in bilateral legs. Symptoms patient has been experiencing are heaviness, aching,  itching, discoloration and  swelling. Patienthas not been wearing compression stockings.

## 2021-06-20 NOTE — LETTER
Letter by Simba Lester MD at      Author: Simba Lester MD Service: -- Author Type: --    Filed:  Encounter Date: 2020 Status: (Other)         Katelyn Garduno MD  [address]  [facility] [address]                                  February 3, 2020    Patient: Tabatha Hogue   MR Number: [id number]   YOB: 1970   Date of Visit: 2020     Dear Dr. Laureen MD:    Thank you for referring Tabatha Hogue to me for evaluation. Below are the relevant portions of my assessment and plan of care.    If you have questions, please do not hesitate to call me. I look forward to following Tabatha along with you.    Sincerely,        Simba Lester MD          CC  No Recipients  Simba Lester MD  2/3/2020  4:39 PM  Sign when Signing Visit  [facility] Surgery Follow up    HPI:    49 y.o. year old female who returns for a follow up.  She is underwent 4 months ago endovenous closure insufficiencies of the left legs.  She still has problems with varicosities and issues and returns for that problem.  Also symptomatic umbilical hernia which we talked about on the past about fixing and repairing which is getting larger and she would like to consider repair at this time point.    Allergies:Erythromycin and Morphine    Past Medical History:   Diagnosis Date   ? Varicose vein of leg        Past Surgical History:   Procedure Laterality Date   ?  SECTION      2 live babies       CURRENT MEDS:  Current Outpatient Medications on File Prior to Visit   Medication Sig Dispense Refill   ? aspirin-acetaminophen-caffeine (EXCEDRIN MIGRAINE) 250-250-65 mg per tablet Take 1 tablet by mouth.       No current facility-administered medications on file prior to visit.        Family History   Problem Relation Age of Onset   ? Varicose Veins Mother         reports that she has never smoked. She has never used smokeless tobacco. She reports current alcohol use. She reports that she does not use  drugs.    Review of Systems:  Negative except symptomatic umbilical hernia and symptomatic varicose veins of both lower legs.  Ongoing issues and symptoms to cause problems with daily living such as standing for long weeks of time with vacuuming and washing dishes.  She is worn compression for this whole time.  Is on my closure to close the major insufficiency and problems of both legs and returns.Otherwise twelve system of review is negative.      OBJECTIVE:  Vitals:    01/29/20 1208   BP: 110/60   Pulse: 72   Resp: 12   Temp: 98.8  F (37.1  C)     There is no height or weight on file to calculate BMI.    EXAM:  GENERAL: This is a well-developed 49 y.o. female who appears her stated age  HEAD: normocephalic  HEENT: Pupils equal and reactive bilaterally  CARDIAC: RRR without murmur  CHEST/LUNG:  Clear to auscultation  ABDOMEN: Soft, nontender, nondistended, no masses    NEUROLOGIC: Focally intact, nonfocal  VASCULAR: Pulses intact, symmetrical upper and lower extremities.                            LABS:  No results found for: WBC, HGB, HCT, MCV, PLT  INR/Prothrombin Time      No results found for: HGBA1C  No results found for: ALT, AST, GGT, ALKPHOS, BILITOT     Images:     US Venous Post Ablation Legs Bilateral (Order [id number])   Imaging   Date: 8/30/2019 Department: [facility] Ultrasound [place] Released By: Tanisha Goel Authorizing: Simba Lester MD   Study Result     [facility]  LOCATION: [facility]  DATE: 8/30/2019     EXAM: US VENOUS POST ABLATION LEGS BILATERAL      INDICATION: Symptomatic varicose veins status post endovenous ablation.     COMPARISON: 08/28/2019.     TECHNIQUE: Duplex imaging is performed utilizing gray-scale, two-dimensional images, and color-flow imaging. Doppler waveform analysis and spectral Doppler imaging is also performed.     FINDINGS:   The right  great saphenous vein is occluded from 1.7 cm distal to the  saphenofemoral junction through the proximal calf.     The left great saphenous vein is occluded from 1.2 cm distal to the saphenofemoral junction through the mid calf.         Assessment/Plan:   1. Symptomatic varicose veins of both lower extremities  Patient has symptomatic veins in both legs.  These have been treated already with endovenous closure and shows continued ongoing varicosities which cause symptoms for her.  We discussed her options and she could do sclerotherapy versus stab phlebectomy versus conservative walking these.  At this time point she would like to see if she can get stab phlebectomy done.  Explained the procedure of stab phlebectomy done in OR under local MAC anesthesia versus general anesthesia risk anesthesia fracture bleeding recurrence rates were discussed along with clotting issues and problems involved.  She also may get numbness around her shaun-incisional areas.  She would like to submit this time and see if this could be covered  - Skin prep - Clip hair as needed; Standing  - Apply 2% Chlorhexidine Gluconate cloth (Rick Cloth) to surgical area.; Standing  - Insert and maintain IV; Standing  - sodium chloride bacteriostatic 0.9 % injection 0.1-0.3 mL  - sodium chloride flush 3 mL (NS)  - NaCl 0.9% IR 0.9 % irrigation solution 1,000 mL (NS)  - Case Request: STAB PHLEBECTOMY, VARICOSE VEIN; Standing  - Verify informed consent for Anesthesia; Standing  - ceFAZolin 2 g in 100 mL in D5W (ANCEF)  - Case Request: STAB PHLEBECTOMY, VARICOSE VEIN  - Compression stockings    2. Venous insufficiency of both lower extremities  Closure by RFA already of insufficiency, stab phlebectomy to be submitted  - Skin prep - Clip hair as needed; Standing  - Apply 2% Chlorhexidine Gluconate cloth (Rick Cloth) to surgical area.; Standing  - Insert and maintain IV; Standing  - sodium chloride bacteriostatic 0.9 % injection 0.1-0.3 mL  - sodium chloride flush 3 mL (NS)  - NaCl 0.9% IR 0.9 % irrigation solution  1,000 mL (NS)  - Case Request: STAB PHLEBECTOMY, VARICOSE VEIN; Standing  - Verify informed consent for Anesthesia; Standing  - ceFAZolin 2 g in 100 mL in D5W (ANCEF)  - Case Request: STAB PHLEBECTOMY, VARICOSE VEIN    3. Umbilical hernia without obstruction and without gangrene  Symptomatic umbilical hernia are discussed doing a a hernia repair at the same time point.  If this is approved we could do both procedures at the same anesthetic setting.  She understands is a surgery for umbilical hernia would be done under a local MAC or general risk of anesthesia fracture bleeding recurrences were discussed.  2 weeks of recovery afterwards no heavy lifting or strenuous activities risks would be in bleeding infection and recurrence.  - Skin prep - Clip hair as needed; Standing  - Apply 2% Chlorhexidine Gluconate cloth (Rick Cloth) to surgical area.; Standing  - Insert and maintain IV; Standing  - sodium chloride bacteriostatic 0.9 % injection 0.1-0.3 mL  - sodium chloride flush 3 mL (NS)  - NaCl 0.9% IR 0.9 % irrigation solution 1,000 mL (NS)  - Case Request: STAB PHLEBECTOMY, VARICOSE VEIN; Standing  - Verify informed consent for Anesthesia; Standing  - ceFAZolin 2 g in 100 mL in D5W (ANCEF)  - Case Request: STAB PHLEBECTOMY, VARICOSE VEIN      No follow-ups on file.     Simba Lester MD  [facility] Department of Surgery

## 2021-06-20 NOTE — PROGRESS NOTES
S: Patient seen in [place] to be measured for [employer] Medi Comfort and Medi Comfort Vitality knee high compression stockings. She has an Rx from Dr. Lester to treat her bilateral varicose veins and venous insufficiency.     O: Goal is to evaluate and measure patient for a compression garment that will help treat her varicose veins. The expected outcome with compliant use is to reduce swelling and control the patient's condition.     A: I took measurements for OTS Medi Comfort and Medi Comfort Vitality knee-highs. (Measurements are as follows: L- Ankle: 21.2, Calf: 33.4, Length: 45. R- Ankle: 21.9, Calf: 35.1, Length: 45). These garments will help to nhibit further fluid accumulation. The order has not been sent for fabrication to [employer] yet because after checking the patient's insurance, she has not met her deductible yet which means she will have to pay in full OOP. I called the patient at 2:45 pm and am awaiting a call back before ordering the garments.     P: Once the patient calls back, if she agrees to go through with the [employer] products I will order them and call Tabatha when the items arrive to delivery to her address since she has experience with compression stockings and is knowledgeable about the uses and cares of the products.     This note has been electronically signed by Brittany Durand.

## 2021-06-23 NOTE — PROGRESS NOTES
3 months compression. US reviewed no RFA option. Will repeat US in 3 months.Umbillical hernia reviewed by Dr Lester and Luci Medrano will preauth

## 2021-06-26 NOTE — PROGRESS NOTES
Progress Notes by Simba Lester MD at 2018 10:00 AM     Author: Simba Lester MD Service: -- Author Type: Physician    Filed: 2018 12:49 PM Encounter Date: 2018 Status: Signed    : Simba Lester MD (Physician)       [facility]      Assessment:     1. varicose veins, bilateral   2. spider veins, bilateral     Plan:     1. Treatment options of conservative therapy of stockings use, exercise, weight loss,  elevating legs when possible.    2. Script for compression stockings 20-30 mm hg  3. Ultrasound to evaluate legs for incmpetency of both deep and superficial system .   4. Surgical treatment, discussed briefly  5. Follow up: 3 months.   6. Call for any questions concerns or issues    Subjective:      Tabatha Hogue is a 48 y.o. female  who was referred by Katelyn Garduno MD  for evaluation of varicose veins. Symptoms include pain, aching, fatigue, burning, edema and dermatitis. Patient has history of leg selling, pain and vein issues that have progressed. Pain and symptoms have affected daily living and work activities needing medications. Here for evaluation today. no stocking or compression devic use    Allergies:Review of patient's allergies indicates no known allergies.    History reviewed. No pertinent past medical history.    Past Surgical History:   Procedure Laterality Date   ?  SECTION      2 live babies       No current outpatient prescriptions on file.       History reviewed. No pertinent family history.     reports that she has never smoked. She has never used smokeless tobacco. She reports that she drinks alcohol. She reports that she does not use illicit drugs.      Review of Systems  Pertinent items are noted in HPI.  A 12 point comprehensive review of systems was negative except as noted.      Objective:     Vitals:    18 1023   BP: 100/68   Pulse: 80   Resp: 20   Temp: 98.2  F (36.8  C)     There is no height or weight on file to  calculate BMI.    EXAM:  GENERAL: This is a well-developed 48 y.o. female who appears her stated age  HEAD: normocephalic  HEENT: Pupils equal and reactive bilaterally  MOUTH: mucus membranes intact. Normal dentation  CARDIAC: RRR without murmur  CHEST/LUNG:  Clear to auscultation bilaterally  ABDOMEN: Soft, nontender, nondistended, no masses noted   NEUROLOGIC: Focally intact, nonfocal, alert and oriented x 3  INTEGUMENT: No open lesions or ulcers  VASCULAR: Pulses intact, symmetrical upper and lower extremities. There areskin changes consistent with chronic venous insufficiency. Varicose veins present in bilateral greater saphenous distribution. Spider veins present bilateral.                        Imaging:      Simba Lester MD  [facility] Surgery Dept.

## 2021-06-27 NOTE — PROGRESS NOTES
"Progress Notes by Simba Lester MD at 2019 10:40 AM     Author: Simba Lester MD Service: -- Author Type: Physician    Filed: 2019  4:16 PM Encounter Date: 2019 Status: Signed    : Simba Lester MD (Physician)       [facility] Surgery Follow up    HPI:    48 y.o. year old female who returns for a follow up.  She has symptomatic leg pain and issues.  She is here for follow-up she is worn compression now for this whole time.  With continued ongoing symptoms.  She also at this time point is some a new issue which is a symptomatic umbilical hernia.  This bulges all the time because of symptoms especially with lifting or doing anything physical.  Started inhibit her working out in her exercise routine.    Allergies:Erythromycin and Morphine    History reviewed. No pertinent past medical history.    Past Surgical History:   Procedure Laterality Date   ?  SECTION      2 live babies       CURRENT MEDS:  Current Outpatient Medications on File Prior to Visit   Medication Sig Dispense Refill   ? aspirin-acetaminophen-caffeine (EXCEDRIN MIGRAINE) 250-250-65 mg per tablet Take 1 tablet by mouth.       No current facility-administered medications on file prior to visit.        History reviewed. No pertinent family history.     reports that  has never smoked. she has never used smokeless tobacco. She reports that she drinks alcohol. She reports that she does not use drugs.    Review of Systems:  Negative except leg pain bilaterally umbilical hernia pain.Otherwise twelve system of review is negative.      OBJECTIVE:  Vitals:    19 1105   BP: 104/76   Pulse: 72   Resp: 16   Temp: 98.5  F (36.9  C)   TempSrc: Oral   Weight: 129 lb 8 oz (58.7 kg)   Height: 5' 5\" (1.651 m)     Body mass index is 21.55 kg/m .    EXAM:  GENERAL: This is a well-developed 48 y.o. female who appears her stated age  HEAD: normocephalic  HEENT: Pupils equal and reactive bilaterally  CARDIAC: RRR without " murmur  CHEST/LUNG:  Clear to auscultation  ABDOMEN: Soft, nontender, nondistended, umbilical hernia which is reducible.  No other masses are noted.  NEUROLOGIC: Focally intact, nonfocal  VASCULAR: Pulses intact, symmetrical upper and lower extremities.  Bilateral varicose veins and spider veins noted bilaterally.                                     LABS:  No results found for: WBC, HGB, HCT, MCV, PLT  INR/Prothrombin Time      No results found for: HGBA1C  No results found for: ALT, AST, GGT, ALKPHOS, BILITOT     Images:     US Venous Insufficiency Legs Bilateral (Order [id number])   Imaging   Date: 10/23/2018 Department: [facility] Ultrasound [place] Released By: Tanisha Goel Authorizing: Simba Lester MD   Study Result     [facility] OUTPATIENT  10/23/2018     EXAM: BILATERAL LOWER EXTREMITY DEEP AND SUPERFICIAL VENOUS DUPLEX ULTRASOUND WITH PHYSIOLOGIC TESTING     INDICATION: Bilateral lower extremity pain and swelling. Symptomatic varicose veins. Assess for incompetent veins.     TECHNIQUE: Supine and upright ultrasound of the deep and superficial veins with Valsalva and compression augmentation maneuvers. Duplex imaging is performed utilizing gray-scale, two-dimensional images, color-flow imaging, Doppler waveform analysis, and   spectral Doppler imaging.      INCOMPETENCY CRITERIA: Deep vein reflux reported when greater than 1,000 ms flow reversal.  Superficial vein reflux reported when greater than 500 ms flow reversal.  vein reflux reported as greater than 350 ms flow reversal.     DEEP VEIN FINDINGS:     RIGHT LEG: The common femoral, profunda femoral, femoral, popliteal, and visualized calf veins are patent and compressible.  Incompetent right common femoral vein and popliteal vein.     LEFT LEG:  The common femoral, profunda femoral, femoral, popliteal, and visualized calf veins are patent and compressible.   No incompetency of the deep left leg  veins.     RIGHT SUPERFICIAL VEIN FINDINGS:  GREAT SAPHENOUS VEIN: Segmental incompetency of the right great saphenous vein at the proximal thigh, knee and mid calf. The right great saphenous vein measures 10 mm at the saphenofemoral junction, 6 mm proximal thigh, 5 mm knee and 6 mm mid calf.     SMALL SAPHENOUS VEIN: The right small saphenous vein is too small to visualize at the proximal calf and mid calf. The right small saphenous vein is patent and competent at the saphenous popliteal junction.     No incompetent perforating veins or abnormal accessory veins identified.     LEFT SUPERFICIAL VEIN FINDINGS:  GREAT SAPHENOUS VEIN: Segmental incompetency of the left great saphenous vein at the proximal thigh and mid calf. The left great saphenous vein measures 7 mm at the saphenofemoral junction, 4 mm proximal thigh, 3 mm knee and 4 mm mid calf.     SMALL SAPHENOUS VEIN: Left small saphenous vein is patent and competent at the saphenous popliteal junction. The left small saphenous vein is too small to visualize at the proximal calf and mid calf.     No incompetent perforating veins or abnormal accessory veins identified.     IMPRESSION:   CONCLUSION:   1.  No deep venous thrombosis of either lower extremity. Incompetent right common femoral vein and popliteal vein.  2.  RIGHT LEG: Segmental incompetency of the right great saphenous vein at the proximal thigh, knee and mid calf.  3.  LEFT LEG: Segmental incompetency of the left great saphenous vein at the proximal thigh and mid calf.         Assessment/Plan:   1. Symptomatic varicose veins of both lower extremities  At this time point I would continue her current compression socks exercise routine and regular basis.. Qualify for a closure procedure but I would probably repeat her ultrasound here in another 3 months to be 6 months after a year out from her prior study.  - Compression stockings  - US Venous Insufficiency Legs Bilateral; Future    2. Venous  insufficiency of both lower extremities  Same  - Compression stockings  -  Venous Insufficiency Legs Bilateral; Future    3. Umbilical hernia without obstruction and without gangrene  At this time point umbilical hernia can be easily repaired under same day surgery in outpatient setting.  This was done under local MAC anesthesia.  This hernia is small enough I think most likely be done with a primary repair.  We discussed the risks of infection bleeding clotting DVTs recurrence rates and at this time point she would like to proceed.  We will try to arrange through my general surgery scheduling clinic.  All questions today and orders were placed.    - Verify informed consent for procedure; Standing  - Verify informed consent for Anesthesia; Standing  - Skin prep - Clip hair as needed; Standing  - Apply 2% Chlorhexidine Gluconate cloth (Rick Cloth) to surgical area.; Standing  - Insert and maintain IV; Standing  - sodium chloride bacteriostatic 0.9 % injection 0.1-0.3 mL; Inject 0.1-0.3 mL under the skin as needed (for IV insertion).        - sodium chloride flush 3 mL (NS); Infuse 3 mL into a venous catheter Line Care.        - Case Request (Do not enter this order if case already scheduled): REPAIR, HERNIA, UMBILICAL, OPEN; Standing  - Place sequential compression device; Standing  - ceFAZolin 2 g in 100 mL in D5W (ANCEF); Infuse 100 mL (2 g total) into a venous catheter 30 minutes before surgery or procedure (prior to procedure).        - Case Request (Do not enter this order if case already scheduled): REPAIR, HERNIA, UMBILICAL, OPEN         Simba Lester MD  [facility] Department of Surgery

## 2021-06-27 NOTE — PROGRESS NOTES
"Progress Notes by Simba Lester MD at 2019 11:40 AM     Author: Simba Lester MD Service: -- Author Type: Physician    Filed: 2019 11:19 PM Encounter Date: 2019 Status: Signed    : Simba Lester MD (Physician)       Follow Up: Varicose Veins/ Venous Insufficiency    Tabatha Hogue is a 48 y.o.  female here for followup. She has worn stockings now for 3 months. I saw them previously in 2019.  Continued progression of disease and symptoms and issues; reviewed ultrasound results. Patient has ongoing symptoms with pain and swelling needing intervention with pain meds secondary to interfering with daily activities and work. This now inhibits daily chores and activities.     Allergies:Erythromycin and Morphine    History reviewed. No pertinent past medical history.    Past Surgical History:   Procedure Laterality Date   ?  SECTION      2 live babies       CURRENT MEDS:  Current Outpatient Medications on File Prior to Visit   Medication Sig Dispense Refill   ? aspirin-acetaminophen-caffeine (EXCEDRIN MIGRAINE) 250-250-65 mg per tablet Take 1 tablet by mouth.       No current facility-administered medications on file prior to visit.        History reviewed. No pertinent family history.     reports that she has never smoked. She has never used smokeless tobacco. She reports that she drinks alcohol. She reports that she does not use drugs.    Review of Systems:  Negative except continued progression leg swelling, pain and varicose veins while wearing compression. It affects chores and daily living especially ones where she has to stand for longer lengths of time. Otherwise twelve system of review is negative.      OBJECTIVE:  Vitals:    19 1202   BP: 110/72   Pulse: 72   Temp: 98.8  F (37.1  C)   Weight: 130 lb (59 kg)   Height: 5' 5\" (1.651 m)     Body mass index is 21.63 kg/m .    EXAM:  GENERAL: This is a well-developed 48 y.o. female who appears her stated " age  HEAD: normocephalic  HEENT: Pupils equal and reactive bilaterally  CARDIAC: RRR without murmur  CHEST/LUNG:  Clear to auscultation  ABDOMEN: Soft, nontender, nondistended, no masses    NEUROLOGIC: Focally intact, nonfocal  VASCULAR: Pulses intact, symmetrical upper and lower extremities. Varicose veins, Spider veins and Chronic venous stasis changes, bilateral                             Imaging:    US Venous Insufficiency Legs Bilateral (Order [id number])   Imaging   Date: 5/8/2019 Department: [facility] Ultrasound [place] Released By: Tanisha Goel Authorizing: Simba Lester MD   Study Result     LOCATION: [facility]  DATE: 5/8/2019     EXAM: BILATERAL LOWER EXTREMITY DEEP AND SUPERFICIAL VENOUS DUPLEX ULTRASOUND WITH PHYSIOLOGIC TESTING     INDICATION: Symptomatic varicose veins. Assess for incompetent veins.     TECHNIQUE: Supine and upright ultrasound of the deep and superficial veins with Valsalva and compression augmentation maneuvers. Duplex imaging is performed utilizing gray-scale, two-dimensional images, color-flow imaging, Doppler waveform analysis, and   spectral Doppler imaging.      INCOMPETENCY CRITERIA: Deep vein reflux reported when greater than 1,000 ms flow reversal.  Superficial vein reflux reported when greater than 500 ms flow reversal.  vein reflux reported as greater than 350 ms flow reversal.     DEEP VEIN FINDINGS:     RIGHT LEG: The common femoral, profunda femoral, femoral, popliteal, and visualized calf veins are patent and compressible.     LEFT LEG:  The common femoral, profunda femoral, femoral, popliteal, and visualized calf veins are patent and compressible.      RIGHT SUPERFICIAL VEIN FINDINGS:  GREAT SAPHENOUS VEIN: Incompetent from the saphenofemoral junction to the mid calf. The vein measures 11 mm at the saphenofemoral junction, 7 mm at the proximal thigh, 6 mm at the knee and mid calf.     SMALL SAPHENOUS  VEIN: Competent from the saphenopopliteal junction to the mid calf.     No incompetent perforating veins or abnormal accessory veins identified.     LEFT SUPERFICIAL VEIN FINDINGS:  GREAT SAPHENOUS VEIN: Incompetent from the saphenofemoral junction to the mid calf. The vein measures 6 mm at the saphenofemoral junction, 5 mm at the proximal thigh, 3 mm at the knee and mid calf.     SMALL SAPHENOUS VEIN: Competent from the saphenopopliteal junction to the mid calf.     No incompetent perforating veins or abnormal accessory veins identified.     IMPRESSION:   CONCLUSION:   1.  No deep venous thrombosis of either lower extremity.  2.  RIGHT LEG: The right superficial venous system is patent, with incompetence of the greater saphenous vein from the saphenofemoral junction to the mid calf.  3.  LEFT LEG: The left superficial venous system is patent, with incompetence of the greater saphenous vein from the saphenofemoral junction to the mid calf.         Assessment/Plan:    She has  incompetency and insuffiencey of the Bilateral  Greater  saphenous vein.  Right measures 11 mm  at the junction, left measures 6 mm. Deep systems are intact. No DVTs. Great candidate for endovenous  closure. We spent counseling time more than 50% of visit: 20 minutes today and discussed the procedure. The risks of anesthesia, infection, bleeding, clotting, DVTs, numbess at the insertion site dermatome and  the process and procedure were discussed. Answered all questions today. Will submit this to Tabatha Hogue's insurance if needed for pre approval.Will set this up when approved. Discussed need to have a  and procedure woul take around 30 minutes with total time 2-3 hours. Also understands a small screening ultrasound 2-3 days out to rule out clot formation at the closed vessel.    DIAGNOSIS: Venous insufficiency of the  right greater saphenous vein and left greater saphenous vein      PROCEDURE: Endovenous closure of the right  greater saphenous vein and left greater saphenous vein    Simba Lester MD  [facility] Surgery Dept.

## 2021-06-28 NOTE — PROGRESS NOTES
Progress Notes by Simba Lester MD at 2020 11:40 AM     Author: Simba Lester MD Service: -- Author Type: Physician    Filed: 2/3/2020  4:41 PM Encounter Date: 2020 Status: Signed    : Simba Lester MD (Physician)       [facility] Surgery Follow up    HPI:    49 y.o. year old female who returns for a follow up.  She is underwent 4 months ago endovenous closure insufficiencies of the left legs.  She still has problems with varicosities and issues and returns for that problem.  Also symptomatic umbilical hernia which we talked about on the past about fixing and repairing which is getting larger and she would like to consider repair at this time point.    Allergies:Erythromycin and Morphine    Past Medical History:   Diagnosis Date   ? Varicose vein of leg        Past Surgical History:   Procedure Laterality Date   ?  SECTION      2 live babies       CURRENT MEDS:  Current Outpatient Medications on File Prior to Visit   Medication Sig Dispense Refill   ? aspirin-acetaminophen-caffeine (EXCEDRIN MIGRAINE) 250-250-65 mg per tablet Take 1 tablet by mouth.       No current facility-administered medications on file prior to visit.        Family History   Problem Relation Age of Onset   ? Varicose Veins Mother         reports that she has never smoked. She has never used smokeless tobacco. She reports current alcohol use. She reports that she does not use drugs.    Review of Systems:  Negative except symptomatic umbilical hernia and symptomatic varicose veins of both lower legs.  Ongoing issues and symptoms to cause problems with daily living such as standing for long weeks of time with vacuuming and washing dishes.  She is worn compression for this whole time.  Is on my closure to close the major insufficiency and problems of both legs and returns.Otherwise twelve system of review is negative.      OBJECTIVE:  Vitals:    20 1208   BP: 110/60   Pulse: 72   Resp: 12   Temp: 98.8  F  (37.1  C)     There is no height or weight on file to calculate BMI.    EXAM:  GENERAL: This is a well-developed 49 y.o. female who appears her stated age  HEAD: normocephalic  HEENT: Pupils equal and reactive bilaterally  CARDIAC: RRR without murmur  CHEST/LUNG:  Clear to auscultation  ABDOMEN: Soft, nontender, nondistended, no masses    NEUROLOGIC: Focally intact, nonfocal  VASCULAR: Pulses intact, symmetrical upper and lower extremities.                            LABS:  No results found for: WBC, HGB, HCT, MCV, PLT  INR/Prothrombin Time      No results found for: HGBA1C  No results found for: ALT, AST, GGT, ALKPHOS, BILITOT     Images:     US Venous Post Ablation Legs Bilateral (Order [id number])   Imaging   Date: 8/30/2019 Department: [facility] Ultrasound [place] Released By: Tanisha Goel Authorizing: Simba Lester MD   Study Result     [facility]  LOCATION: [facility]  DATE: 8/30/2019     EXAM: US VENOUS POST ABLATION LEGS BILATERAL      INDICATION: Symptomatic varicose veins status post endovenous ablation.     COMPARISON: 08/28/2019.     TECHNIQUE: Duplex imaging is performed utilizing gray-scale, two-dimensional images, and color-flow imaging. Doppler waveform analysis and spectral Doppler imaging is also performed.     FINDINGS:   The right  great saphenous vein is occluded from 1.7 cm distal to the saphenofemoral junction through the proximal calf.     The left great saphenous vein is occluded from 1.2 cm distal to the saphenofemoral junction through the mid calf.         Assessment/Plan:   1. Symptomatic varicose veins of both lower extremities  Patient has symptomatic veins in both legs.  These have been treated already with endovenous closure and shows continued ongoing varicosities which cause symptoms for her.  We discussed her options and she could do sclerotherapy versus stab phlebectomy versus conservative walking these.  At this  time point she would like to see if she can get stab phlebectomy done.  Explained the procedure of stab phlebectomy done in OR under local MAC anesthesia versus general anesthesia risk anesthesia fracture bleeding recurrence rates were discussed along with clotting issues and problems involved.  She also may get numbness around her shaun-incisional areas.  She would like to submit this time and see if this could be covered  - Skin prep - Clip hair as needed; Standing  - Apply 2% Chlorhexidine Gluconate cloth (Rick Cloth) to surgical area.; Standing  - Insert and maintain IV; Standing  - sodium chloride bacteriostatic 0.9 % injection 0.1-0.3 mL  - sodium chloride flush 3 mL (NS)  - NaCl 0.9% IR 0.9 % irrigation solution 1,000 mL (NS)  - Case Request: STAB PHLEBECTOMY, VARICOSE VEIN; Standing  - Verify informed consent for Anesthesia; Standing  - ceFAZolin 2 g in 100 mL in D5W (ANCEF)  - Case Request: STAB PHLEBECTOMY, VARICOSE VEIN  - Compression stockings    2. Venous insufficiency of both lower extremities  Closure by RFA already of insufficiency, stab phlebectomy to be submitted  - Skin prep - Clip hair as needed; Standing  - Apply 2% Chlorhexidine Gluconate cloth (Rick Cloth) to surgical area.; Standing  - Insert and maintain IV; Standing  - sodium chloride bacteriostatic 0.9 % injection 0.1-0.3 mL  - sodium chloride flush 3 mL (NS)  - NaCl 0.9% IR 0.9 % irrigation solution 1,000 mL (NS)  - Case Request: STAB PHLEBECTOMY, VARICOSE VEIN; Standing  - Verify informed consent for Anesthesia; Standing  - ceFAZolin 2 g in 100 mL in D5W (ANCEF)  - Case Request: STAB PHLEBECTOMY, VARICOSE VEIN    3. Umbilical hernia without obstruction and without gangrene  Symptomatic umbilical hernia are discussed doing a a hernia repair at the same time point.  If this is approved we could do both procedures at the same anesthetic setting.  She understands is a surgery for umbilical hernia would be done under a local MAC or general  risk of anesthesia fracture bleeding recurrences were discussed.  2 weeks of recovery afterwards no heavy lifting or strenuous activities risks would be in bleeding infection and recurrence.  - Skin prep - Clip hair as needed; Standing  - Apply 2% Chlorhexidine Gluconate cloth (Rick Cloth) to surgical area.; Standing  - Insert and maintain IV; Standing  - sodium chloride bacteriostatic 0.9 % injection 0.1-0.3 mL  - sodium chloride flush 3 mL (NS)  - NaCl 0.9% IR 0.9 % irrigation solution 1,000 mL (NS)  - Case Request: STAB PHLEBECTOMY, VARICOSE VEIN; Standing  - Verify informed consent for Anesthesia; Standing  - ceFAZolin 2 g in 100 mL in D5W (ANCEF)  - Case Request: STAB PHLEBECTOMY, VARICOSE VEIN      No follow-ups on file.     Simba Lester MD  [facility] Department of Surgery

## 2025-01-27 ENCOUNTER — OFFICE VISIT (OUTPATIENT)
Dept: SURGERY | Facility: CLINIC | Age: 55
End: 2025-01-27
Payer: COMMERCIAL

## 2025-01-27 VITALS
DIASTOLIC BLOOD PRESSURE: 70 MMHG | WEIGHT: 140.8 LBS | SYSTOLIC BLOOD PRESSURE: 110 MMHG | HEIGHT: 65 IN | BODY MASS INDEX: 23.46 KG/M2

## 2025-01-27 DIAGNOSIS — K42.9 UMBILICAL HERNIA WITHOUT OBSTRUCTION AND WITHOUT GANGRENE: Primary | ICD-10-CM

## 2025-01-27 PROCEDURE — 99203 OFFICE O/P NEW LOW 30 MIN: CPT | Performed by: SPECIALIST

## 2025-01-27 RX ORDER — ACETAMINOPHEN 325 MG/1
975 TABLET ORAL ONCE
OUTPATIENT
Start: 2025-01-27 | End: 2025-01-27

## 2025-01-27 NOTE — PROGRESS NOTES
"      HPI:  This is a 54 year old female here today with concerns of pain and bulging in her umbilicus area. She has noted this for the past a a few  years\",. The symptoms have progressed and increased over this time. She comes in for evaluation secondary to the hernia causing enough issues to bother them with daily activities or chores.    Allergies:Erythromycin and Morphine    Past Medical History:   Diagnosis Date    Varicose vein of leg        Past Surgical History:   Procedure Laterality Date     SECTION      2 live babies       CURRENT MEDS:  No current facility-administered medications for this visit.       Family History   Problem Relation Age of Onset    Varicose Veins Mother         reports that she has never smoked. She has never used smokeless tobacco. She reports current alcohol use. She reports that she does not use drugs.    Review of Systems - Negative except pain and bulge. Otherwise twelve system of review is negative.      Vitals:    25 0944   BP: 110/70   Weight: 63.9 kg (140 lb 12.8 oz)   Height: 1.651 m (5' 5\")       Body mass index is 23.43 kg/m .    EXAM:  General: NAD   HEENT: normocephalic, PERRLA and EOMS intact  [place]: Mucus membranes moist  Neck: Supple  Chest: Clear to auscultation bilaterally  CV: RRR  ABD: Soft nontender and nondistended, umbilical hernia, reducible  EXT: Warm, pulses intact,   Neuro: Alert and oriented x3  Back: no CVA tenderness      Assessment/Plan: Pt with a umbilical hernia. I discussed this at length with She.  I went over conservative management as well as surgical treatment of this.. I would plan on doing this via anopen  approach with possible use of mesh. I went over the small risks of surgery including but not limited to bleeding and infection, anesthesia, recurrence rates and nerve injury. I discussed the expected recovery time as well. Will get this scheduled. She will contact us to have this scheduled.      Simba Lester MD ,MD [place] " Tayler Lester MD  General Surgery [phone]  Vascular Surgery [phone]

## 2025-01-27 NOTE — H&P (VIEW-ONLY)
"      HPI:  This is a 54 year old female here today with concerns of pain and bulging in her umbilicus area. She has noted this for the past a a few  years\",. The symptoms have progressed and increased over this time. She comes in for evaluation secondary to the hernia causing enough issues to bother them with daily activities or chores.    Allergies:Erythromycin and Morphine    Past Medical History:   Diagnosis Date    Varicose vein of leg        Past Surgical History:   Procedure Laterality Date     SECTION      2 live babies       CURRENT MEDS:  No current facility-administered medications for this visit.       Family History   Problem Relation Age of Onset    Varicose Veins Mother         reports that she has never smoked. She has never used smokeless tobacco. She reports current alcohol use. She reports that she does not use drugs.    Review of Systems - Negative except pain and bulge. Otherwise twelve system of review is negative.      Vitals:    25 0944   BP: 110/70   Weight: 63.9 kg (140 lb 12.8 oz)   Height: 1.651 m (5' 5\")       Body mass index is 23.43 kg/m .    EXAM:  General: NAD   HEENT: normocephalic, PERRLA and EOMS intact  Mounth: Mucus membranes moist  Neck: Supple  Chest: Clear to auscultation bilaterally  CV: RRR  ABD: Soft nontender and nondistended, umbilical hernia, reducible  EXT: Warm, pulses intact,   Neuro: Alert and oriented x3  Back: no CVA tenderness      Assessment/Plan: Pt with a umbilical hernia. I discussed this at length with She.  I went over conservative management as well as surgical treatment of this.. I would plan on doing this via anopen  approach with possible use of mesh. I went over the small risks of surgery including but not limited to bleeding and infection, anesthesia, recurrence rates and nerve injury. I discussed the expected recovery time as well. Will get this scheduled. She will contact us to have this scheduled.      Simba Lester MD ,MD [place] " Tayler Lester MD  General Surgery [phone]  Vascular Surgery [phone]

## 2025-01-27 NOTE — LETTER
"2025      Tabatha Hogue  [address]  [facility] [address]      Dear Colleague,    Thank you for referring your patient, Tabatha Hogue, to the [facility] [place]. Please see a copy of my visit note below.          HPI:  This is a 54 year old female here today with concerns of pain and bulging in her umbilicus area. She has noted this for the past a a few  years\",. The symptoms have progressed and increased over this time. She comes in for evaluation secondary to the hernia causing enough issues to bother them with daily activities or chores.    Allergies:Erythromycin and Morphine    Past Medical History:   Diagnosis Date     Varicose vein of leg        Past Surgical History:   Procedure Laterality Date      SECTION      2 live babies       CURRENT MEDS:  No current facility-administered medications for this visit.       Family History   Problem Relation Age of Onset     Varicose Veins Mother         reports that she has never smoked. She has never used smokeless tobacco. She reports current alcohol use. She reports that she does not use drugs.    Review of Systems - Negative except pain and bulge. Otherwise twelve system of review is negative.      Vitals:    25 0944   BP: 110/70   Weight: 63.9 kg (140 lb 12.8 oz)   Height: 1.651 m (5' 5\")       Body mass index is 23.43 kg/m .    EXAM:  General: NAD   HEENT: normocephalic, PERRLA and EOMS intact  Mounth: Mucus membranes moist  Neck: Supple  Chest: Clear to auscultation bilaterally  CV: RRR  ABD: Soft nontender and nondistended, umbilical hernia, reducible  EXT: Warm, pulses intact,   Neuro: Alert and oriented x3  Back: no CVA tenderness      Assessment/Plan: Pt with a umbilical hernia. I discussed this at length with She.  I went over conservative management as well as surgical treatment of this.. I would plan on doing this via anopen  approach with possible use of mesh. I went over the " small risks of surgery including but not limited to bleeding and infection, anesthesia, recurrence rates and nerve injury. I discussed the expected recovery time as well. Will get this scheduled. She will contact us to have this scheduled.      Simba Lester MD ,MD Simba Lester MD  General Surgery [phone]  Vascular Surgery [phone]          Again, thank you for allowing me to participate in the care of your patient.        Sincerely,        Simba Lester MD    Electronically signed

## 2025-02-19 ENCOUNTER — ANESTHESIA EVENT (OUTPATIENT)
Dept: SURGERY | Facility: AMBULATORY SURGERY CENTER | Age: 55
End: 2025-02-19
Payer: COMMERCIAL

## 2025-02-20 ENCOUNTER — HOSPITAL ENCOUNTER (OUTPATIENT)
Facility: AMBULATORY SURGERY CENTER | Age: 55
End: 2025-02-20
Attending: SPECIALIST
Payer: COMMERCIAL

## 2025-02-20 ENCOUNTER — ANESTHESIA (OUTPATIENT)
Dept: SURGERY | Facility: AMBULATORY SURGERY CENTER | Age: 55
End: 2025-02-20
Payer: COMMERCIAL

## 2025-02-20 VITALS
HEART RATE: 63 BPM | DIASTOLIC BLOOD PRESSURE: 65 MMHG | SYSTOLIC BLOOD PRESSURE: 101 MMHG | BODY MASS INDEX: 23.32 KG/M2 | HEIGHT: 65 IN | WEIGHT: 140 LBS | TEMPERATURE: 97.5 F | OXYGEN SATURATION: 98 % | RESPIRATION RATE: 16 BRPM

## 2025-02-20 DIAGNOSIS — K42.9 UMBILICAL HERNIA WITHOUT OBSTRUCTION AND WITHOUT GANGRENE: Primary | ICD-10-CM

## 2025-02-20 RX ORDER — SODIUM CHLORIDE, SODIUM LACTATE, POTASSIUM CHLORIDE, CALCIUM CHLORIDE 600; 310; 30; 20 MG/100ML; MG/100ML; MG/100ML; MG/100ML
INJECTION, SOLUTION INTRAVENOUS CONTINUOUS
OUTPATIENT
Start: 2025-02-20

## 2025-02-20 RX ORDER — GLYCOPYRROLATE 0.2 MG/ML
INJECTION, SOLUTION INTRAMUSCULAR; INTRAVENOUS PRN
Status: DISCONTINUED | OUTPATIENT
Start: 2025-02-20 | End: 2025-02-20

## 2025-02-20 RX ORDER — DEXAMETHASONE SODIUM PHOSPHATE 4 MG/ML
INJECTION, SOLUTION INTRA-ARTICULAR; INTRALESIONAL; INTRAMUSCULAR; INTRAVENOUS; SOFT TISSUE PRN
Status: DISCONTINUED | OUTPATIENT
Start: 2025-02-20 | End: 2025-02-20

## 2025-02-20 RX ORDER — ONDANSETRON 2 MG/ML
4 INJECTION INTRAMUSCULAR; INTRAVENOUS EVERY 30 MIN PRN
OUTPATIENT
Start: 2025-02-20

## 2025-02-20 RX ORDER — ACETAMINOPHEN 325 MG/1
975 TABLET ORAL ONCE
OUTPATIENT
Start: 2025-02-20

## 2025-02-20 RX ORDER — LIDOCAINE HYDROCHLORIDE 20 MG/ML
INJECTION, SOLUTION INFILTRATION; PERINEURAL PRN
Status: DISCONTINUED | OUTPATIENT
Start: 2025-02-20 | End: 2025-02-20

## 2025-02-20 RX ORDER — HYDROCODONE BITARTRATE AND ACETAMINOPHEN 5; 325 MG/1; MG/1
1-2 TABLET ORAL EVERY 6 HOURS PRN
Qty: 18 TABLET | Refills: 0 | Status: SHIPPED | OUTPATIENT
Start: 2025-02-20 | End: 2025-02-23

## 2025-02-20 RX ORDER — CEFAZOLIN SODIUM 2 G/100ML
2 INJECTION, SOLUTION INTRAVENOUS SEE ADMIN INSTRUCTIONS
OUTPATIENT
Start: 2025-02-20

## 2025-02-20 RX ORDER — NALOXONE HYDROCHLORIDE 0.4 MG/ML
0.1 INJECTION, SOLUTION INTRAMUSCULAR; INTRAVENOUS; SUBCUTANEOUS
OUTPATIENT
Start: 2025-02-20

## 2025-02-20 RX ORDER — FENTANYL CITRATE 0.05 MG/ML
50 INJECTION, SOLUTION INTRAMUSCULAR; INTRAVENOUS EVERY 5 MIN PRN
OUTPATIENT
Start: 2025-02-20

## 2025-02-20 RX ORDER — CEFAZOLIN SODIUM 2 G/100ML
2 INJECTION, SOLUTION INTRAVENOUS
Status: COMPLETED | OUTPATIENT
Start: 2025-02-20 | End: 2025-02-20

## 2025-02-20 RX ORDER — HYDROMORPHONE HCL IN WATER/PF 6 MG/30 ML
0.2 PATIENT CONTROLLED ANALGESIA SYRINGE INTRAVENOUS EVERY 5 MIN PRN
OUTPATIENT
Start: 2025-02-20

## 2025-02-20 RX ORDER — OXYCODONE HYDROCHLORIDE 10 MG/1
10 TABLET ORAL
OUTPATIENT
Start: 2025-02-20

## 2025-02-20 RX ORDER — LIDOCAINE 40 MG/G
CREAM TOPICAL
OUTPATIENT
Start: 2025-02-20

## 2025-02-20 RX ORDER — ONDANSETRON 4 MG/1
4 TABLET, ORALLY DISINTEGRATING ORAL EVERY 30 MIN PRN
OUTPATIENT
Start: 2025-02-20

## 2025-02-20 RX ORDER — ACETAMINOPHEN 325 MG/1
975 TABLET ORAL ONCE
Status: COMPLETED | OUTPATIENT
Start: 2025-02-20 | End: 2025-02-20

## 2025-02-20 RX ORDER — PROPOFOL 10 MG/ML
INJECTION, EMULSION INTRAVENOUS CONTINUOUS PRN
Status: DISCONTINUED | OUTPATIENT
Start: 2025-02-20 | End: 2025-02-20

## 2025-02-20 RX ORDER — ONDANSETRON 2 MG/ML
INJECTION INTRAMUSCULAR; INTRAVENOUS PRN
Status: DISCONTINUED | OUTPATIENT
Start: 2025-02-20 | End: 2025-02-20

## 2025-02-20 RX ORDER — FENTANYL CITRATE 0.05 MG/ML
25 INJECTION, SOLUTION INTRAMUSCULAR; INTRAVENOUS EVERY 5 MIN PRN
OUTPATIENT
Start: 2025-02-20

## 2025-02-20 RX ORDER — DEXAMETHASONE SODIUM PHOSPHATE 4 MG/ML
4 INJECTION, SOLUTION INTRA-ARTICULAR; INTRALESIONAL; INTRAMUSCULAR; INTRAVENOUS; SOFT TISSUE
OUTPATIENT
Start: 2025-02-20

## 2025-02-20 RX ORDER — FENTANYL CITRATE 50 UG/ML
INJECTION, SOLUTION INTRAMUSCULAR; INTRAVENOUS PRN
Status: DISCONTINUED | OUTPATIENT
Start: 2025-02-20 | End: 2025-02-20

## 2025-02-20 RX ORDER — OXYCODONE HYDROCHLORIDE 5 MG/1
5 TABLET ORAL
OUTPATIENT
Start: 2025-02-20

## 2025-02-20 RX ORDER — HYDROMORPHONE HCL IN WATER/PF 6 MG/30 ML
0.4 PATIENT CONTROLLED ANALGESIA SYRINGE INTRAVENOUS EVERY 5 MIN PRN
OUTPATIENT
Start: 2025-02-20

## 2025-02-20 RX ORDER — KETOROLAC TROMETHAMINE 30 MG/ML
INJECTION, SOLUTION INTRAMUSCULAR; INTRAVENOUS PRN
Status: DISCONTINUED | OUTPATIENT
Start: 2025-02-20 | End: 2025-02-20

## 2025-02-20 RX ADMIN — CEFAZOLIN SODIUM 2 G: 2 INJECTION, SOLUTION INTRAVENOUS at 09:23

## 2025-02-20 RX ADMIN — LIDOCAINE HYDROCHLORIDE 2 ML: 20 INJECTION, SOLUTION INFILTRATION; PERINEURAL at 09:28

## 2025-02-20 RX ADMIN — ONDANSETRON 4 MG: 2 INJECTION INTRAMUSCULAR; INTRAVENOUS at 09:31

## 2025-02-20 RX ADMIN — FENTANYL CITRATE 50 MCG: 50 INJECTION, SOLUTION INTRAMUSCULAR; INTRAVENOUS at 09:28

## 2025-02-20 RX ADMIN — DEXAMETHASONE SODIUM PHOSPHATE 4 MG: 4 INJECTION, SOLUTION INTRA-ARTICULAR; INTRALESIONAL; INTRAMUSCULAR; INTRAVENOUS; SOFT TISSUE at 09:31

## 2025-02-20 RX ADMIN — GLYCOPYRROLATE 0.2 MG: 0.2 INJECTION, SOLUTION INTRAMUSCULAR; INTRAVENOUS at 09:28

## 2025-02-20 RX ADMIN — KETOROLAC TROMETHAMINE 15 MG: 30 INJECTION, SOLUTION INTRAMUSCULAR; INTRAVENOUS at 09:50

## 2025-02-20 RX ADMIN — SODIUM CHLORIDE, SODIUM LACTATE, POTASSIUM CHLORIDE, CALCIUM CHLORIDE: 600; 310; 30; 20 INJECTION, SOLUTION INTRAVENOUS at 09:17

## 2025-02-20 RX ADMIN — ACETAMINOPHEN 975 MG: 325 TABLET ORAL at 09:12

## 2025-02-20 RX ADMIN — PROPOFOL 200 MCG/KG/MIN: 10 INJECTION, EMULSION INTRAVENOUS at 09:28

## 2025-02-20 NOTE — ANESTHESIA PREPROCEDURE EVALUATION
"Anesthesia Pre-Procedure Evaluation    Patient: Tabatha Hogue   MRN: [id number] : 1970        Procedure : Procedure(s):  HERNIORRHAPHY, UMBILICAL, OPEN          Past Medical History:   Diagnosis Date    Motion sickness     PONV (postoperative nausea and vomiting)     Varicose vein of leg       Past Surgical History:   Procedure Laterality Date    BIOPSY/REMOVAL, LYMPH NODE(S)       SECTION      2 live babies      Allergies   Allergen Reactions    Shellfish-Derived Products GI Disturbance    Tramadol GI Disturbance    Erythromycin Nausea and Vomiting and Other (See Comments)     GI    Morphine Other (See Comments)     vomiting      Social History     Tobacco Use    Smoking status: Never    Smokeless tobacco: Never   Substance Use Topics    Alcohol use: Yes     Comment: Alcoholic Drinks/day: occasional      Wt Readings from Last 1 Encounters:   25 63.5 kg (140 lb)        Anesthesia Evaluation   Pt has had prior anesthetic.     History of anesthetic complications  - motion sickness and PONV.      ROS/MED HX  ENT/Pulmonary:  - neg pulmonary ROS     Neurologic:  - neg neurologic ROS     Cardiovascular:  - neg cardiovascular ROS     METS/Exercise Tolerance: >4 METS    Hematologic:  - neg hematologic  ROS     Musculoskeletal:  - neg musculoskeletal ROS     GI/Hepatic: Comment: Hernia  - neg GI/hepatic ROS     Renal/Genitourinary:  - neg Renal ROS     Endo:  - neg endo ROS     Psychiatric/Substance Use:  - neg psychiatric ROS     Infectious Disease:  - neg infectious disease ROS     Malignancy:  - neg malignancy ROS     Other:            Physical Exam    Airway  airway exam normal           Respiratory Devices and Support         Dental           Cardiovascular   cardiovascular exam normal          Pulmonary   pulmonary exam normal                OUTSIDE LABS:  CBC: No results found for: \"WBC\", \"HGB\", \"HCT\", \"PLT\"  BMP: No results found for: \"NA\", \"POTASSIUM\", \"CHLORIDE\", \"CO2\", \"BUN\", \"CR\", " "\"GLC\"  COAGS: No results found for: \"PTT\", \"INR\", \"FIBR\"  POC: No results found for: \"BGM\", \"HCG\", \"HCGS\"  HEPATIC: No results found for: \"ALBUMIN\", \"PROTTOTAL\", \"ALT\", \"AST\", \"GGT\", \"ALKPHOS\", \"BILITOTAL\", \"BILIDIRECT\", \"ADELAIDE\"  OTHER: No results found for: \"PH\", \"LACT\", \"A1C\", \"BRUNA\", \"PHOS\", \"MAG\", \"LIPASE\", \"AMYLASE\", \"TSH\", \"T4\", \"T3\", \"CRP\", \"SED\"    Anesthesia Plan    ASA Status:  1    NPO Status:  NPO Appropriate    Anesthesia Type: MAC.              Consents            Postoperative Care       PONV prophylaxis: Ondansetron (or other 5HT-3), Dexamethasone or Solumedrol     Comments:               Andrew Soliman MD    I have reviewed the pertinent notes and labs in the chart from the past 30 days and (re)examined the patient.  Any updates or changes from those notes are reflected in this note.    Clinically Significant Risk Factors Present on Admission                                          "

## 2025-02-20 NOTE — ANESTHESIA CARE TRANSFER NOTE
Patient: Tabatha Hogue    Procedure: Procedure(s):  HERNIORRHAPHY, UMBILICAL, OPEN       Diagnosis: Umbilical hernia without obstruction and without gangrene [K42.9]  Diagnosis Additional Information: No value filed.    Anesthesia Type:   MAC     Note:    Oropharynx: oropharynx clear of all foreign objects and spontaneously breathing  Level of Consciousness: awake  Oxygen Supplementation: room air    Independent Airway: airway patency satisfactory and stable  Dentition: dentition unchanged  Vital Signs Stable: post-procedure vital signs reviewed and stable  Report to RN Given: handoff report given  Patient transferred to: Phase II    Handoff Report: Identifed the Patient, Identified the Reponsible Provider, Reviewed the pertinent medical history, Discussed the surgical course, Reviewed Intra-OP anesthesia mangement and issues during anesthesia, Set expectations for post-procedure period and Allowed opportunity for questions and acknowledgement of understanding      Vitals:  Vitals Value Taken Time   BP 90/54 02/20/25 0955   Temp 97.5  F (36.4  C) 02/20/25 0955   Pulse 75 02/20/25 0955   Resp 16 02/20/25 0955   SpO2 94 % 02/20/25 0955       Electronically Signed By: MADDIE Arias CRNA  February 20, 2025  9:58 AM

## 2025-02-20 NOTE — OR NURSING
Offered patient pregnancy test.  Explained there are risks associated with anesthesia and pregnancy.  Patient verbalizes understanding, denies possibility of pregnancy and declines pregnancy test.    TYRA CANELA RN on 2/20/2025 at 9:12 AM

## 2025-02-20 NOTE — DISCHARGE INSTRUCTIONS
If you have any questions or concerns regarding your procedure please contact Dr. Lester, his office number is [phone]     You have received 975 mg of Acetaminophen (Tylenol) at 0912. Please do not take an additional dose of Tylenol until after 3:12pm.     Do not exceed 4,000 mg of acetaminophen during a 24 hour period and keep in mind that acetaminophen can also be found in many over-the-counter cold medications as well as narcotics that may be given for pain.      You received a medication called Toradol (a stronger IV ibuprofen) at 0950. Do NOT take any Ibuprofen / Advil / Aleve / Naproxen or products containing Ibuprofen until 3:50pm or later.    [facility]   Adult Discharge Orders & Instructions     For 24 hours after surgery    Get plenty of rest.  A responsible adult must stay with you for at least 24 hours after you leave the hospital.   Do not drive or use heavy equipment.  If you have weakness or tingling, don't drive or use heavy equipment until this feeling goes away.  Do not drink alcohol.  Avoid strenuous or risky activities.  Ask for help when climbing stairs.   You may feel lightheaded.  IF so, sit for a few minutes before standing.  Have someone help you get up.   If you have nausea (feel sick to your stomach): Drink only clear liquids such as apple juice, ginger ale, broth or 7-Up.  Rest may also help.  Be sure to drink enough fluids.  Move to a regular diet as you feel able.  You may have a slight fever. Call the doctor if your fever is over 100 F (37.7 C) (taken under the tongue) or lasts longer than 24 hours.  You may have a dry mouth, a sore throat, muscle aches or trouble sleeping.  These should go away after 24 hours.  Do not make important or legal decisions.   Call your doctor for any of the followin.  Signs of infection (fever, growing tenderness at the surgery site, a large amount of drainage or bleeding, severe pain, foul-smelling drainage, redness,  swelling).    2. It has been over 8 to 10 hours since surgery and you are still not able to urinate (pass water).    3.  Headache for over 24 hours.

## 2025-02-20 NOTE — ANESTHESIA POSTPROCEDURE EVALUATION
Patient: Tabatha Hogue    Procedure: Procedure(s):  HERNIORRHAPHY, UMBILICAL, OPEN       Anesthesia Type:  MAC    Note:     Postop Pain Control: Uneventful            Sign Out: Well controlled pain   PONV: No   Neuro/Psych: Uneventful            Sign Out: Acceptable/Baseline neuro status   Airway/Respiratory: Uneventful            Sign Out: Acceptable/Baseline resp. status   CV/Hemodynamics: Uneventful            Sign Out: Acceptable CV status; No obvious hypovolemia; No obvious fluid overload   Other NRE: NONE   DID A NON-ROUTINE EVENT OCCUR? No           Last vitals:  Vitals Value Taken Time   /65 02/20/25 1045   Temp 97.5  F (36.4  C) 02/20/25 1045   Pulse 64 02/20/25 1050   Resp 16 02/20/25 1045   SpO2 97 % 02/20/25 1050   Vitals shown include unfiled device data.    Electronically Signed By: Andrew Soliman MD  February 20, 2025  12:09 PM

## 2025-02-20 NOTE — OP NOTE
Operative Note    Name:  Tabatha Hogue  PCP:  Katelyn Garduno  Procedure Date:  2/20/2025      Procedure(s):  HERNIORRHAPHY, UMBILICAL, OPEN    Pre-Procedure Diagnosis:  Umbilical hernia without obstruction and without gangrene [K42.9]    Post-Procedure Diagnosis:    umbilical hernia    OR staff:  Surgeon(s):  Simba Lester MD    Circulator: Emilee aMyen RN  Scrub Person: Kerrie Esteban      Anesthesia Type:  MAC with Local    Past Medical History:   Diagnosis Date    Motion sickness     PONV (postoperative nausea and vomiting)     Varicose vein of leg        Patient Active Problem List    Diagnosis Date Noted    Umbilical hernia without obstruction and without gangrene 01/27/2025     Priority: Medium    Menorrhagia 11/30/2011     Priority: Medium       Findings:  Small 0.5 cm by 1.8 cm hernia repaired in two layers    Operative Report:    Consent was obtained.  The patient was taken to the operating room and placed in a supine position.  MAC anesthesia was administered by anesthesia staff.  Patient's abdomen is prepped and draped sterile manner.  Briefing timeout is performed anesthesia and our staff.  Began by infiltrating local anesthetics.  Lumbosacral send a field block of the area.  Incision made superior to the umbilicus I dissect out the hernia sac and resected this at its base.  Defect was not terribly big is about 1/2 cm x 1.8 cm I actually repaired this in 2 layers I repaired it transversely first with just 1 stitch is closed up nicely I then closed the the second layer above it then with with 1 sutures and a primary suture 3 of them to 2 double reinforce this.  I tacked the umbilicus down with a 3-0 Vicryl suture close the deep layers with 3-0 Vicryl and skin with a 4-0 Monocryl subcuticular stitch.  Steri-Strips sterile dressing applied transferred PACU in stable condition all sponge needle counts are correct.    Estimated Blood Loss: 3 mL from 2/20/2025  9:25 AM to 2/20/2025  9:54  AM    Specimens:    none       Drains:   none    Complications:    None    Simba Lester MD     Date: 2/20/2025  Time: 10:04 AM

## 2025-02-20 NOTE — INTERVAL H&P NOTE
I have reviewed the surgical (or preoperative) H&P that is linked to this encounter, and examined the patient. There are no significant changes.        Simba Lester MD  General Surgery [phone]  Vascular Surgery [phone]          Clinical Conditions Present on Arrival:  Clinically Significant Risk Factors Present on Admission

## 2025-02-24 ENCOUNTER — TELEPHONE (OUTPATIENT)
Dept: SURGERY | Facility: CLINIC | Age: 55
End: 2025-02-24
Payer: COMMERCIAL

## 2025-02-24 NOTE — TELEPHONE ENCOUNTER
[facility] Post-Op Phone Call                     Surgeon: Simba Lester    Date of Surgery: 2/20/25  Surgery: Umbilical Herniorrhaphy   Discharge Date: 2/20/25    Date/Time Called:   Date: 2/24/2025 Time: 10:54 AM   Attempt: First    Pain Control:  Intensity: Mild (1 - 3)  Duration/Location/Explain:   What makes it better/worse?     Medications:  Narcotic Use - Yes  Drug type: Norco   Frequency: HS     Incisions:  Drainage? clean and dry  Any fever type symptoms? No  Comment:     GI:  Nausea? No  Vomiting? No  BM? Yes  Gas? Yes  Voiding Frequency? 4 or more/day   Appetite? Good    Activity:  Walking activity? Yes  Frequency/Type: as tolerated   Restrictions: return to normal activity as tolerated     Return to Work Plans?  Expected date N/A   Do you need anything from us in this regard? No     Post-op appointment made? No        Thank you for your time. Please do not hesitate to call us with any questions or concerns.    Call completed by: Beverly Farley RN